# Patient Record
Sex: FEMALE | Race: WHITE | Employment: FULL TIME | URBAN - METROPOLITAN AREA
[De-identification: names, ages, dates, MRNs, and addresses within clinical notes are randomized per-mention and may not be internally consistent; named-entity substitution may affect disease eponyms.]

---

## 2017-01-09 ENCOUNTER — TELEPHONE (OUTPATIENT)
Dept: OBGYN CLINIC | Facility: CLINIC | Age: 22
End: 2017-01-09

## 2017-01-09 NOTE — TELEPHONE ENCOUNTER
Pt requesting a refill on birth control until her appt with TRAE on 3/16/17. Pt states that she is not moving, which the message states below.   Last Annual 6/10/15 with TRAE.    (Pt had a now show 11/10/16 and a cancel appt 9/8/16.)  Pt had a refill on graciela

## 2017-01-17 ENCOUNTER — TELEPHONE (OUTPATIENT)
Dept: OBGYN CLINIC | Facility: CLINIC | Age: 22
End: 2017-01-17

## 2017-02-19 ENCOUNTER — HOSPITAL ENCOUNTER (OUTPATIENT)
Age: 22
Discharge: HOME | End: 2017-02-19
Attending: EMERGENCY MEDICINE
Payer: COMMERCIAL

## 2017-02-19 VITALS
SYSTOLIC BLOOD PRESSURE: 128 MMHG | WEIGHT: 128 LBS | DIASTOLIC BLOOD PRESSURE: 65 MMHG | BODY MASS INDEX: 25.13 KG/M2 | TEMPERATURE: 98 F | RESPIRATION RATE: 16 BRPM | OXYGEN SATURATION: 97 % | HEART RATE: 96 BPM | HEIGHT: 60 IN

## 2017-02-19 DIAGNOSIS — J40 BRONCHITIS: Primary | ICD-10-CM

## 2017-02-19 LAB — S PYO AG THROAT QL: NEGATIVE

## 2017-02-19 PROCEDURE — 99214 OFFICE O/P EST MOD 30 MIN: CPT

## 2017-02-19 PROCEDURE — 99213 OFFICE O/P EST LOW 20 MIN: CPT

## 2017-02-19 PROCEDURE — 87430 STREP A AG IA: CPT

## 2017-02-19 RX ORDER — AZITHROMYCIN 250 MG/1
TABLET, FILM COATED ORAL
Qty: 1 PACKAGE | Refills: 0 | Status: SHIPPED | OUTPATIENT
Start: 2017-02-19 | End: 2017-03-02

## 2017-02-19 NOTE — ED NOTES
Patient presents with cough, sore throat, and runny nose for 1 week. Patient state she had a fever last Tuesday, not since then. LS clear, throat appears red. Rapid strep pending.

## 2017-02-19 NOTE — ED PROVIDER NOTES
Riley Marcum is a 19year old female who presents for evaluation of sore throat and cough for the past week  HPI:   Pt complains of sore throat and cough. Patient has been using over-the-counter medication with minimal relief of symptoms.   She denies Labs Reviewed - No data to display    No orders to display  Patient had negative rapid strep screen  MDM           Disposition and Plan     Clinical Impression:  Bronchitis  (primary encounter diagnosis)    Disposition:  Discharge    Follow-up:  [facility]

## 2017-03-02 ENCOUNTER — OFFICE VISIT (OUTPATIENT)
Dept: OBGYN CLINIC | Facility: CLINIC | Age: 22
End: 2017-03-02

## 2017-03-02 ENCOUNTER — APPOINTMENT (OUTPATIENT)
Dept: LAB | Age: 22
End: 2017-03-02
Attending: OBSTETRICS & GYNECOLOGY
Payer: COMMERCIAL

## 2017-03-02 VITALS
HEART RATE: 105 BPM | DIASTOLIC BLOOD PRESSURE: 84 MMHG | WEIGHT: 132 LBS | SYSTOLIC BLOOD PRESSURE: 125 MMHG | BODY MASS INDEX: 24.6 KG/M2 | HEIGHT: 61.5 IN

## 2017-03-02 DIAGNOSIS — Z11.3 VENEREAL DISEASE SCREENING: ICD-10-CM

## 2017-03-02 DIAGNOSIS — Z01.419 ENCOUNTER FOR GYNECOLOGICAL EXAMINATION WITHOUT ABNORMAL FINDING: Primary | ICD-10-CM

## 2017-03-02 PROCEDURE — 36415 COLL VENOUS BLD VENIPUNCTURE: CPT

## 2017-03-02 PROCEDURE — 86780 TREPONEMA PALLIDUM: CPT

## 2017-03-02 PROCEDURE — 87340 HEPATITIS B SURFACE AG IA: CPT

## 2017-03-02 PROCEDURE — 87389 HIV-1 AG W/HIV-1&-2 AB AG IA: CPT

## 2017-03-02 PROCEDURE — 99395 PREV VISIT EST AGE 18-39: CPT | Performed by: OBSTETRICS & GYNECOLOGY

## 2017-03-02 PROCEDURE — 86803 HEPATITIS C AB TEST: CPT

## 2017-03-02 RX ORDER — LEVONORGESTREL AND ETHINYL ESTRADIOL 0.15-0.03
1 KIT ORAL
Qty: 3 PACKAGE | Refills: 3 | Status: SHIPPED | OUTPATIENT
Start: 2017-03-02 | End: 2018-10-03

## 2017-03-02 NOTE — PROGRESS NOTES
HPI:    Patient ID: Anastasiia See is a 19year old female. HPI  with normally regular menses on OCP but Rx ran out in December. New relationship since December- met through friends. Condoms for BC. She works at a [employer]. [employer] doing well. enlarged and not tender. Cervix exhibits no motion tenderness and no discharge. Right adnexum displays no mass, no tenderness and no fullness. Left adnexum displays no mass, no tenderness and no fullness. No vaginal discharge found.    Musculoskeletal: She

## 2017-03-03 LAB
C TRACH DNA SPEC QL NAA+PROBE: NEGATIVE
HBV SURFACE AG SERPL QL IA: NONREACTIVE
HCV AB SERPL QL IA: NONREACTIVE
HIV1+2 AB SERPL QL IA: NONREACTIVE
N GONORRHOEA DNA SPEC QL NAA+PROBE: NEGATIVE
T PALLIDUM AB SER QL: NEGATIVE
T VAGINALIS RRNA SPEC QL NAA+PROBE: NEGATIVE

## 2018-03-27 ENCOUNTER — TELEPHONE (OUTPATIENT)
Dept: OBGYN CLINIC | Facility: CLINIC | Age: 23
End: 2018-03-27

## 2018-10-03 ENCOUNTER — OFFICE VISIT (OUTPATIENT)
Dept: OBGYN CLINIC | Facility: CLINIC | Age: 23
End: 2018-10-03

## 2018-10-03 VITALS
WEIGHT: 148.81 LBS | SYSTOLIC BLOOD PRESSURE: 120 MMHG | BODY MASS INDEX: 28.1 KG/M2 | DIASTOLIC BLOOD PRESSURE: 83 MMHG | HEART RATE: 76 BPM | HEIGHT: 61 IN

## 2018-10-03 DIAGNOSIS — Z11.3 SCREENING EXAMINATION FOR VENEREAL DISEASE: ICD-10-CM

## 2018-10-03 DIAGNOSIS — Z01.419 ENCOUNTER FOR GYNECOLOGICAL EXAMINATION WITHOUT ABNORMAL FINDING: Primary | ICD-10-CM

## 2018-10-03 DIAGNOSIS — Z11.3 SCREEN FOR STD (SEXUALLY TRANSMITTED DISEASE): ICD-10-CM

## 2018-10-03 DIAGNOSIS — Z12.4 SCREENING FOR MALIGNANT NEOPLASM OF CERVIX: ICD-10-CM

## 2018-10-03 DIAGNOSIS — B36.0 TINEA VERSICOLOR: ICD-10-CM

## 2018-10-03 PROCEDURE — 99395 PREV VISIT EST AGE 18-39: CPT | Performed by: OBSTETRICS & GYNECOLOGY

## 2018-10-03 RX ORDER — MUPIROCIN CALCIUM 20 MG/G
CREAM TOPICAL
COMMUNITY
Start: 2012-06-02 | End: 2020-01-18

## 2018-10-03 RX ORDER — LEVONORGESTREL AND ETHINYL ESTRADIOL 0.15-0.03
1 KIT ORAL
Qty: 3 PACKAGE | Refills: 3 | Status: SHIPPED | OUTPATIENT
Start: 2018-10-03 | End: 2020-01-18

## 2018-10-03 NOTE — PROGRESS NOTES
HPI:    Patient ID: Earline Carrier is a 23year old female. HPI  with regular menses and condoms for [facility]. Back with Kushal in the past year. He is father of her child. New family history with Dad's MI in 2017 at age 62.  Only issue on ROS is a sma mass. There is no tenderness. There is no rebound and no guarding. Genitourinary: Vagina normal and uterus normal. No breast discharge. There is no rash or lesion on the right labia. There is no rash or lesion on the left labia.  Uterus is not enlarged an

## 2018-10-25 ENCOUNTER — TELEPHONE (OUTPATIENT)
Dept: OBGYN CLINIC | Facility: CLINIC | Age: 23
End: 2018-10-25

## 2018-10-25 NOTE — TELEPHONE ENCOUNTER
Informed pt that TRAE stated that her STD culture is negative for gonorrhea, chlamydia and trichomonas. Informed pt that TRAE stated her pap shows a very mild abnormality called atypia.   When this happens in age < 27, the lab then does an HPV test which g

## 2018-10-25 NOTE — TELEPHONE ENCOUNTER
----- Message from Lorraine Ventura DO sent at 10/19/2018  6:37 PM CDT -----  Lovely Klein. Your STD culture is negative for gonorrhea, chlamydia and trichomonas. The pap shows a very mild abnormality called \"atypia\".  When this happens in age < 27, the l

## 2019-07-18 ENCOUNTER — HOSPITAL ENCOUNTER (OUTPATIENT)
Age: 24
Discharge: HOME | End: 2019-07-18
Attending: FAMILY MEDICINE
Payer: COMMERCIAL

## 2019-07-18 VITALS
RESPIRATION RATE: 20 BRPM | BODY MASS INDEX: 28.32 KG/M2 | TEMPERATURE: 99 F | OXYGEN SATURATION: 98 % | HEART RATE: 96 BPM | WEIGHT: 150 LBS | DIASTOLIC BLOOD PRESSURE: 74 MMHG | HEIGHT: 61 IN | SYSTOLIC BLOOD PRESSURE: 121 MMHG

## 2019-07-18 DIAGNOSIS — B00.1 COLD SORE: Primary | ICD-10-CM

## 2019-07-18 PROCEDURE — 99213 OFFICE O/P EST LOW 20 MIN: CPT

## 2019-07-18 PROCEDURE — 99214 OFFICE O/P EST MOD 30 MIN: CPT

## 2019-07-18 RX ORDER — VALACYCLOVIR HYDROCHLORIDE 1 G/1
2 TABLET, FILM COATED ORAL 2 TIMES DAILY
Qty: 4 TABLET | Refills: 0 | Status: SHIPPED | OUTPATIENT
Start: 2019-07-18 | End: 2019-07-19

## 2019-07-18 NOTE — ED PROVIDER NOTES
[facility]    History   Patient presents with:  Rash Skin Problem (integumentary)    Stated Complaint: rash    HPI    Patient is here with a blistery rash noted over the chin and the left side of the lowe crop of blistery rash noted to the left chin. Similar ulcer noted over the left lower lip. No significant erythema. No purulent discharge. Psychiatric: She has a normal mood and affect. Her behavior is normal.   Nursing note and vitals reviewed.

## 2019-08-17 ENCOUNTER — HOSPITAL ENCOUNTER (OUTPATIENT)
Age: 24
Discharge: HOME | End: 2019-08-17
Payer: COMMERCIAL

## 2019-08-17 VITALS
RESPIRATION RATE: 18 BRPM | HEIGHT: 61 IN | SYSTOLIC BLOOD PRESSURE: 111 MMHG | HEART RATE: 90 BPM | WEIGHT: 138 LBS | TEMPERATURE: 98 F | DIASTOLIC BLOOD PRESSURE: 70 MMHG | BODY MASS INDEX: 26.06 KG/M2 | OXYGEN SATURATION: 98 %

## 2019-08-17 DIAGNOSIS — R59.0 LYMPHADENOPATHY OF LEFT CERVICAL REGION: Primary | ICD-10-CM

## 2019-08-17 LAB — S PYO AG THROAT QL: NEGATIVE

## 2019-08-17 PROCEDURE — 99213 OFFICE O/P EST LOW 20 MIN: CPT

## 2019-08-17 PROCEDURE — 99214 OFFICE O/P EST MOD 30 MIN: CPT

## 2019-08-17 PROCEDURE — 87430 STREP A AG IA: CPT

## 2019-08-17 RX ORDER — METHYLPREDNISOLONE 4 MG/1
TABLET ORAL
Qty: 1 PACKAGE | Refills: 0 | Status: SHIPPED | OUTPATIENT
Start: 2019-08-17 | End: 2020-01-18

## 2019-08-17 RX ORDER — AMOXICILLIN AND CLAVULANATE POTASSIUM 875; 125 MG/1; MG/1
1 TABLET, FILM COATED ORAL 2 TIMES DAILY
Qty: 20 TABLET | Refills: 0 | Status: SHIPPED | OUTPATIENT
Start: 2019-08-17 | End: 2019-08-27

## 2019-08-17 NOTE — ED PROVIDER NOTES
Patient presents with:  Rash Skin Problem (integumentary)      HPI:     Tom Ernst is a 20year old female with no significant past medical history presents with a chief complaint of swelling to the lymph nodes on the R side of neck.  Pt reports it st Tablet Therapy Pack          Sig: Dosepack: take as directed          Dispense:  1 Package          Refill:  0      Amoxicillin-Pot Clavulanate 875-125 MG Oral Tab          Sig: Take 1 tablet by mouth 2 (two) times daily for 10 days.           Dispense:  20

## 2019-08-17 NOTE — ED INITIAL ASSESSMENT (HPI)
PATIENT ARRIVED AMBULATORY TO ROOM C/O A RASH TO THE FACE THAT STARTED THIS MORNING. NO NEW PRODUCTS. NO FEVERS. +ITCHINESS. EASY NON LABORED RESPIRATIONS.

## 2019-08-22 ENCOUNTER — OFFICE VISIT (OUTPATIENT)
Dept: INTERNAL MEDICINE CLINIC | Facility: CLINIC | Age: 24
End: 2019-08-22

## 2019-08-22 VITALS
WEIGHT: 150 LBS | BODY MASS INDEX: 28 KG/M2 | HEART RATE: 68 BPM | SYSTOLIC BLOOD PRESSURE: 116 MMHG | DIASTOLIC BLOOD PRESSURE: 81 MMHG | RESPIRATION RATE: 16 BRPM

## 2019-08-22 DIAGNOSIS — R59.0 ENLARGED LYMPH NODE IN NECK: Primary | ICD-10-CM

## 2019-08-22 PROCEDURE — 99203 OFFICE O/P NEW LOW 30 MIN: CPT | Performed by: INTERNAL MEDICINE

## 2019-08-22 NOTE — PROGRESS NOTES
Earline Carrier is a 20year old female. HPI:     1. Swollen Lymph Nodes in Neck    Had developed 3 lymph nodes on left side of jaw and by ear a week ago. Went to urgent care and was started on augmentin 875 mg BID x 10 days. Feeling better.  No fever, ch BS's,no masses, HSM or tenderness  EXTREMITIES: no cyanosis, clubbing or edema    ASSESSMENT AND PLAN:   1. Enlarged lymph node in neck    Finish taking the 10 day course of augmentin 875 mg BID. Call if fever, chills or lymph nodes enlarge again.     The p

## 2020-01-18 ENCOUNTER — LAB ENCOUNTER (OUTPATIENT)
Dept: LAB | Facility: HOSPITAL | Age: 25
End: 2020-01-18
Attending: OBSTETRICS & GYNECOLOGY
Payer: COMMERCIAL

## 2020-01-18 ENCOUNTER — OFFICE VISIT (OUTPATIENT)
Dept: OBGYN CLINIC | Facility: CLINIC | Age: 25
End: 2020-01-18

## 2020-01-18 VITALS
HEIGHT: 61 IN | BODY MASS INDEX: 28.13 KG/M2 | DIASTOLIC BLOOD PRESSURE: 83 MMHG | WEIGHT: 149 LBS | HEART RATE: 81 BPM | SYSTOLIC BLOOD PRESSURE: 116 MMHG

## 2020-01-18 DIAGNOSIS — Z01.419 ENCOUNTER FOR GYNECOLOGICAL EXAMINATION WITHOUT ABNORMAL FINDING: Primary | ICD-10-CM

## 2020-01-18 DIAGNOSIS — Z11.3 SCREENING EXAMINATION FOR VENEREAL DISEASE: ICD-10-CM

## 2020-01-18 DIAGNOSIS — Z12.4 SCREENING FOR MALIGNANT NEOPLASM OF CERVIX: ICD-10-CM

## 2020-01-18 LAB
HBV SURFACE AG SER-ACNC: <0.1 [IU]/L
HBV SURFACE AG SERPL QL IA: NONREACTIVE
HCV AB SERPL QL IA: NONREACTIVE

## 2020-01-18 PROCEDURE — 87340 HEPATITIS B SURFACE AG IA: CPT

## 2020-01-18 PROCEDURE — 99395 PREV VISIT EST AGE 18-39: CPT | Performed by: OBSTETRICS & GYNECOLOGY

## 2020-01-18 PROCEDURE — 86780 TREPONEMA PALLIDUM: CPT

## 2020-01-18 PROCEDURE — 86803 HEPATITIS C AB TEST: CPT

## 2020-01-18 PROCEDURE — 87389 HIV-1 AG W/HIV-1&-2 AB AG IA: CPT

## 2020-01-18 PROCEDURE — 36415 COLL VENOUS BLD VENIPUNCTURE: CPT

## 2020-01-18 RX ORDER — LEVONORGESTREL AND ETHINYL ESTRADIOL 0.15-0.03
1 KIT ORAL
Qty: 3 PACKAGE | Refills: 3 | Status: SHIPPED | OUTPATIENT
Start: 2020-01-18 | End: 2021-07-07

## 2020-01-20 LAB
C TRACH DNA SPEC QL NAA+PROBE: NEGATIVE
N GONORRHOEA DNA SPEC QL NAA+PROBE: NEGATIVE
T PALLIDUM AB SER QL: NEGATIVE
T VAGINALIS RRNA SPEC QL NAA+PROBE: NEGATIVE

## 2020-01-20 NOTE — PROGRESS NOTES
HPI:    Patient ID: Rosa Maria Noyola is a 30year old female. HPI  with regular menses on OCP for BC. She is in new relationship since October. She is due for repeat pap and will need STD screening.      Review of Systems   Constitutional: Lacy Ashley displays no mass, no tenderness and no fullness. Left adnexum displays no mass, no tenderness and no fullness. No vaginal discharge. Genitourinary Comments: Bilateral breasts without skin / nipple changes, mass, tenderness or axillary adenopathy.

## 2020-01-28 ENCOUNTER — TELEPHONE (OUTPATIENT)
Dept: OBGYN CLINIC | Facility: CLINIC | Age: 25
End: 2020-01-28

## 2020-01-28 ENCOUNTER — TELEPHONE (OUTPATIENT)
Dept: PEDIATRICS CLINIC | Facility: CLINIC | Age: 25
End: 2020-01-28

## 2020-01-28 NOTE — TELEPHONE ENCOUNTER
----- Message from Cecy Villeda DO sent at 1/22/2020 11:18 AM CST -----  Sierra Arevalo. The pap smear shows a persistent mild abnormality this year.  This is one step above normal.  With these results and the fact that you are over 24, we'll need to sche

## 2020-01-29 NOTE — TELEPHONE ENCOUNTER
Pt was advised of need for Colpo per TRAE.  Appt scheduled. Pt states she is on birth control and has been for a long time. Pt did not get menses this month but she took multiple home pregnancy tests and they were all negative.   Pt has continued on the p

## 2020-02-11 ENCOUNTER — OFFICE VISIT (OUTPATIENT)
Dept: OBGYN CLINIC | Facility: CLINIC | Age: 25
End: 2020-02-11

## 2020-02-11 VITALS
DIASTOLIC BLOOD PRESSURE: 89 MMHG | HEART RATE: 90 BPM | SYSTOLIC BLOOD PRESSURE: 123 MMHG | BODY MASS INDEX: 29 KG/M2 | WEIGHT: 152.63 LBS

## 2020-02-11 DIAGNOSIS — R87.612 PAPANICOLAOU SMEAR OF CERVIX WITH LOW GRADE SQUAMOUS INTRAEPITHELIAL LESION (LGSIL): Primary | ICD-10-CM

## 2020-02-11 DIAGNOSIS — Z32.00 PREGNANCY EXAMINATION OR TEST, PREGNANCY UNCONFIRMED: ICD-10-CM

## 2020-02-11 LAB — CONTROL LINE PRESENT WITH A CLEAR BACKGROUND (YES/NO): YES YES/NO

## 2020-02-11 PROCEDURE — 81025 URINE PREGNANCY TEST: CPT | Performed by: OBSTETRICS & GYNECOLOGY

## 2020-02-11 PROCEDURE — 57454 BX/CURETT OF CERVIX W/SCOPE: CPT | Performed by: OBSTETRICS & GYNECOLOGY

## 2020-02-13 PROBLEM — R87.612 PAPANICOLAOU SMEAR OF CERVIX WITH LOW GRADE SQUAMOUS INTRAEPITHELIAL LESION (LGSIL): Status: ACTIVE | Noted: 2020-02-13

## 2020-02-13 NOTE — PROGRESS NOTES
Colpo w/Cx Biopsy and ECC    Pregnancy Results: negative from urine test   Birth control method(s) used:   OCP  Consent signed. Procedure discussed with patient in detail including indication, risk, benefits, alternatives and complications.     Pre-Procedu

## 2020-02-24 ENCOUNTER — TELEPHONE (OUTPATIENT)
Dept: OBGYN CLINIC | Facility: CLINIC | Age: 25
End: 2020-02-24

## 2020-02-24 NOTE — TELEPHONE ENCOUNTER
Received refill request from [facility] for pts OCP. Form faxed back denied stating that pt had OCP refill on 1/18/2020 for 3 packs with 3 refills. Pt should still have refills available.

## 2020-11-10 ENCOUNTER — OFFICE VISIT (OUTPATIENT)
Dept: INTERNAL MEDICINE CLINIC | Facility: CLINIC | Age: 25
End: 2020-11-10
Payer: COMMERCIAL

## 2020-11-10 VITALS
WEIGHT: 164 LBS | DIASTOLIC BLOOD PRESSURE: 85 MMHG | BODY MASS INDEX: 30.96 KG/M2 | RESPIRATION RATE: 16 BRPM | HEIGHT: 61 IN | SYSTOLIC BLOOD PRESSURE: 128 MMHG | HEART RATE: 101 BPM

## 2020-11-10 DIAGNOSIS — Z00.00 PHYSICAL EXAM, ANNUAL: Primary | ICD-10-CM

## 2020-11-10 DIAGNOSIS — R06.83 SNORING: ICD-10-CM

## 2020-11-10 PROCEDURE — 3074F SYST BP LT 130 MM HG: CPT | Performed by: INTERNAL MEDICINE

## 2020-11-10 PROCEDURE — 99395 PREV VISIT EST AGE 18-39: CPT | Performed by: INTERNAL MEDICINE

## 2020-11-10 PROCEDURE — 99072 ADDL SUPL MATRL&STAF TM PHE: CPT | Performed by: INTERNAL MEDICINE

## 2020-11-10 PROCEDURE — 3079F DIAST BP 80-89 MM HG: CPT | Performed by: INTERNAL MEDICINE

## 2020-11-10 PROCEDURE — 3008F BODY MASS INDEX DOCD: CPT | Performed by: INTERNAL MEDICINE

## 2020-11-10 NOTE — PROGRESS NOTES
HPI:   Taryn Hernandez is a 30year old female who presents for a complete physical exam. Symptoms: periods are regular. Patient complains of nothing.        Immunization History  Administered            Date(s) Administered    DPT/HIB               02/28/ Tobacco Use      Smoking status: Never Smoker      Smokeless tobacco: Current User    Alcohol use:  Yes      Alcohol/week: 0.0 standard drinks      Comment: SOCIALLY    Drug use: No      Comment: NONE    Occ:  : no. Children: no.   Exercise:  twice p Health maintenance, will check fasting Lipids, CMP, and CBC. Pt referred for screening colonoscopy. Pt info handouts given for: exercise, low fat diet and breast self-exam. Pt' s weight is Body mass index is 30.99 kg/m². , recommended low fat diet and aerob

## 2020-11-11 ENCOUNTER — LAB ENCOUNTER (OUTPATIENT)
Dept: LAB | Age: 25
End: 2020-11-11
Attending: INTERNAL MEDICINE
Payer: COMMERCIAL

## 2020-11-11 DIAGNOSIS — Z00.00 PHYSICAL EXAM, ANNUAL: ICD-10-CM

## 2020-11-11 PROCEDURE — 84443 ASSAY THYROID STIM HORMONE: CPT

## 2020-11-11 PROCEDURE — 80061 LIPID PANEL: CPT

## 2020-11-11 PROCEDURE — 36415 COLL VENOUS BLD VENIPUNCTURE: CPT

## 2020-11-11 PROCEDURE — 81001 URINALYSIS AUTO W/SCOPE: CPT

## 2020-11-11 PROCEDURE — 80053 COMPREHEN METABOLIC PANEL: CPT

## 2020-11-11 PROCEDURE — 85025 COMPLETE CBC W/AUTO DIFF WBC: CPT

## 2021-03-23 ENCOUNTER — TELEPHONE (OUTPATIENT)
Dept: OBGYN CLINIC | Facility: CLINIC | Age: 26
End: 2021-03-23

## 2021-03-23 NOTE — TELEPHONE ENCOUNTER
Received fax from pharmacy for [facility] refill request. Fax sent back denied.      No upcoming appt made  Last annual=  1/18/2020

## 2021-04-29 ENCOUNTER — TELEPHONE (OUTPATIENT)
Dept: OBGYN CLINIC | Facility: CLINIC | Age: 26
End: 2021-04-29

## 2021-04-29 NOTE — TELEPHONE ENCOUNTER
OCP refill request received via fax. Pt's last annual was on 1/18/20, pap LSIL on 1/18 colpo on 2/11 mild dysplasia. Has next annual on 7/7/21.  Refill request to [facility]

## 2021-07-07 ENCOUNTER — OFFICE VISIT (OUTPATIENT)
Dept: OBGYN CLINIC | Facility: CLINIC | Age: 26
End: 2021-07-07
Payer: COMMERCIAL

## 2021-07-07 VITALS
WEIGHT: 167.19 LBS | HEART RATE: 106 BPM | SYSTOLIC BLOOD PRESSURE: 128 MMHG | BODY MASS INDEX: 32 KG/M2 | DIASTOLIC BLOOD PRESSURE: 82 MMHG

## 2021-07-07 DIAGNOSIS — Z12.4 SCREENING FOR MALIGNANT NEOPLASM OF CERVIX: ICD-10-CM

## 2021-07-07 DIAGNOSIS — Z11.3 SCREENING EXAMINATION FOR VENEREAL DISEASE: ICD-10-CM

## 2021-07-07 DIAGNOSIS — Z01.419 ENCOUNTER FOR GYNECOLOGICAL EXAMINATION WITHOUT ABNORMAL FINDING: Primary | ICD-10-CM

## 2021-07-07 PROCEDURE — 3074F SYST BP LT 130 MM HG: CPT | Performed by: OBSTETRICS & GYNECOLOGY

## 2021-07-07 PROCEDURE — 3079F DIAST BP 80-89 MM HG: CPT | Performed by: OBSTETRICS & GYNECOLOGY

## 2021-07-07 PROCEDURE — 99395 PREV VISIT EST AGE 18-39: CPT | Performed by: OBSTETRICS & GYNECOLOGY

## 2021-07-07 RX ORDER — NORETHINDRONE ACETATE AND ETHINYL ESTRADIOL 1MG-20(21)
1 KIT ORAL DAILY
Qty: 84 TABLET | Refills: 3 | Status: SHIPPED | OUTPATIENT
Start: 2021-07-07 | End: 2022-01-27

## 2021-07-08 LAB
C TRACH DNA SPEC QL NAA+PROBE: NEGATIVE
N GONORRHOEA DNA SPEC QL NAA+PROBE: NEGATIVE

## 2021-07-09 LAB — T VAGINALIS RRNA SPEC QL NAA+PROBE: NEGATIVE

## 2021-07-11 NOTE — PROGRESS NOTES
HPI/Subjective:   Patient ID: Anastasiia See is a 30year old female. HPI   with menses q 28d / 5d / cramps on day 1. New relationship using condom and wishes to return to [facility]. No new family or personal medical issues.  Greta Roosevelt is now 5 y/o and d No rash or lesion. Vagina: Normal. No vaginal discharge. Cervix: No cervical motion tenderness or discharge. Uterus: Not enlarged and not tender. Adnexa:         Right: No mass, tenderness or fullness.           Left: No mass, tenderne

## 2021-07-13 NOTE — PROGRESS NOTES
Meli Banks. You have negative pap result. Repeat exam in 1 year and pap in 3 years. We just ended the call. I would not treat a vaginal infection suggested by a pap reading unless the patient had symptoms. Remember that your STD screen is also negative.  Roxana Carvajal

## 2022-01-26 ENCOUNTER — PATIENT MESSAGE (OUTPATIENT)
Dept: OBGYN CLINIC | Facility: CLINIC | Age: 27
End: 2022-01-26

## 2022-01-26 NOTE — TELEPHONE ENCOUNTER
From: Ronald Eckert  To: Rema Butler.  DO Mihir  Sent: 1/26/2022 1:53 PM CST  Subject: Birth control    Hello,     I was wondering if I would be able to switch brand of birth control, my periods have been really bad on this one were it’s very heavy and I

## 2022-01-27 RX ORDER — LEVONORGESTREL AND ETHINYL ESTRADIOL 0.15-0.03
1 KIT ORAL DAILY
Qty: 84 TABLET | Refills: 1 | Status: SHIPPED | OUTPATIENT
Start: 2022-01-27 | End: 2023-01-27

## 2022-01-27 NOTE — TELEPHONE ENCOUNTER
I called and spoke with Dave Thorpe concerning OCP. She had used Julissa Ruse with success in remote past. I sent 6 month Rx now.  I'll see her in July or August for annual.

## 2022-08-02 ENCOUNTER — HOSPITAL ENCOUNTER (OUTPATIENT)
Age: 27
Discharge: HOME | End: 2022-08-02
Payer: COMMERCIAL

## 2022-08-02 VITALS
OXYGEN SATURATION: 100 % | WEIGHT: 145 LBS | BODY MASS INDEX: 27.38 KG/M2 | TEMPERATURE: 98 F | HEIGHT: 61 IN | HEART RATE: 89 BPM | RESPIRATION RATE: 18 BRPM | DIASTOLIC BLOOD PRESSURE: 88 MMHG | SYSTOLIC BLOOD PRESSURE: 119 MMHG

## 2022-08-02 DIAGNOSIS — H66.92 LEFT OTITIS MEDIA, UNSPECIFIED OTITIS MEDIA TYPE: ICD-10-CM

## 2022-08-02 DIAGNOSIS — H72.91 RUPTURED TYMPANIC MEMBRANE, RIGHT: Primary | ICD-10-CM

## 2022-08-02 PROCEDURE — 99213 OFFICE O/P EST LOW 20 MIN: CPT

## 2022-08-02 RX ORDER — AMOXICILLIN 875 MG/1
875 TABLET, COATED ORAL 2 TIMES DAILY
Qty: 20 TABLET | Refills: 0 | Status: SHIPPED | OUTPATIENT
Start: 2022-08-02 | End: 2022-08-12

## 2022-08-02 RX ORDER — OFLOXACIN 3 MG/ML
5 SOLUTION AURICULAR (OTIC) DAILY
Qty: 10 ML | Refills: 0 | Status: SHIPPED | OUTPATIENT
Start: 2022-08-02 | End: 2022-08-09

## 2022-08-02 NOTE — ED INITIAL ASSESSMENT (HPI)
Pt presents with bilateral ear pain x 2-3 days. Pt reports \"muffled\" hearing. Tylenol PO taken at 630a today.

## 2022-08-15 ENCOUNTER — OFFICE VISIT (OUTPATIENT)
Facility: LOCATION | Age: 27
End: 2022-08-15
Payer: COMMERCIAL

## 2022-08-15 DIAGNOSIS — H69.93 UNSPECIFIED EUSTACHIAN TUBE DISORDER, BILATERAL: Primary | ICD-10-CM

## 2022-08-15 DIAGNOSIS — H72.91 RUPTURED EARDRUM, RIGHT: Primary | ICD-10-CM

## 2022-08-15 DIAGNOSIS — H91.93 AUDITORY IMPAIRMENT, BILATERAL: ICD-10-CM

## 2022-12-02 ENCOUNTER — TELEPHONE (OUTPATIENT)
Dept: OBGYN CLINIC | Facility: CLINIC | Age: 27
End: 2022-12-02

## 2022-12-02 NOTE — TELEPHONE ENCOUNTER
LMP 10/26, periods every 28-30 days (5w2d). Pt agrees to see all providers. OBN appt made on 12/20. Pt to start pnv with dha, folic acid, and iron. Pr to call with questions, spotting, or cramping. Per pt, HT 5'1\", #, BMI 28.3.

## 2022-12-20 ENCOUNTER — NURSE ONLY (OUTPATIENT)
Dept: OBGYN CLINIC | Facility: CLINIC | Age: 27
End: 2022-12-20
Payer: COMMERCIAL

## 2022-12-20 DIAGNOSIS — Z34.91 FIRST TRIMESTER PREGNANCY: Primary | ICD-10-CM

## 2022-12-20 RX ORDER — CHOLECALCIFEROL (VITAMIN D3) 25 MCG
1 TABLET,CHEWABLE ORAL DAILY
COMMUNITY

## 2022-12-20 NOTE — PROGRESS NOTES
Pt seen for OBN-PC appt today with no complaints. Normal PN labs and qual lab ordered. Pt advised all labs must be completed and resulted no later then a few days prior to MD appt to prevent appt to be rescheduled. Pt requesting NPN appt on a Tuesday and accepted NPN appt on 12/27. Pre-pregnancy weight: 150 lb   Current weight: Does not know. Height: 5' 1\"  Partner's name is Everett Buchanan Brought) Korey Castellon contact #[phone]; race: White    Pt's occupation: [employer]    MEDICAL HISTORY    Anemia No    Anesthetic complications No    Anxiety/Depression  No    Autoimmune Disorder No    Asthma  No    Cancer No    Diabetes  No    Gyne/breast Surgery No    Heart Disease No    Hepatitis/Liver Disease  No    History of blood transfusion No    History of abnormal pap Yes 2019. States Colpo was done and paps since have been normal    Hypertension  No    Infertility  No    Kidney Disease/Frequent UTIs  No    Medication Allergies No    Latex Allergies No    Food Allergies  No    Neurological Disorder/Epilepsy No    Operations/Hospitalizations No    TB exposure  No    Thyroid Dysfunction No    Trauma/Violence  No    Uterine Anomaly  No    Uterine Fibroids  No    Variocosities/DVTs No    Smoker No    Drug usage in prior year No    Alcohol Yes Socially. Stopped before pregnancy    Would you accept a blood transfusion? If no, are you a Episcopal? Yes                INFECTION HISTORY    Chlamydia No    Pt or partner have hx of Genital Herpes No    Gonorrhea No    Hepatitis B No    HIV No    HPV No    MRSA No    Syphilis No    Tattoos No    Live with someone or Exposed to TB No    Rash or viral illness since LMP  No    Varicella Yes In childhood    Recent Travel (or planned travel) to [facility] area for self and or partner No    Pets Yes Dog & cat.  Boyfriend changes litter box       GENETICS SCREENING    Genetic Screening    Genetic Screening/Teratology Counseling- Includes patient, baby's father, or anyone in either family with:  Patient's age 28 years or older as of estimated date of delivery: No   Thalassemia ([facility] [place], [address], or  background): MCV less than 80: No   Neural tube defect (Meningomyelocele, Spina bifida, or Anencephaly): No   Congenital heart defect: No   Down syndrome: No   Ori-Sachs (Ashkenazi Yars[place], [place], [place]): No   Canavan disease (Ashkenazi Yars[place]): No   Familial dysautonomia (Ashkenazi Yarsani): No   Sickle cell disease or trait (): No   Hemophilia or other blood disorders: No   Muscular dystrophy: No    Cystic fibrosis: No   Triston's chorea: No   Intellectual disability and/or autism: No   Other inherited genetic or chromosomal disorder: No   Maternal metabolic disorder (eg. Type 1 diabetes, PKU): No   Patient or baby's father had child with birth defects not listed above: No   Recurrent pregnancy loss, or a stillbirth: No   Medications (including supplements, vitamins, herbs, or OTC drugs)/illicit/recreational drugs/alcohol since last menstrual period: No               MISC    Infant vaccinations  Yes    Pt. Has answered NO 5P questions and has NO  risk factors.     Pt. Given What pregnant women need to know handout.          '

## 2022-12-22 ENCOUNTER — LAB ENCOUNTER (OUTPATIENT)
Dept: LAB | Age: 27
End: 2022-12-22
Attending: OBSTETRICS & GYNECOLOGY
Payer: COMMERCIAL

## 2022-12-22 DIAGNOSIS — Z34.91 FIRST TRIMESTER PREGNANCY: ICD-10-CM

## 2022-12-22 LAB
ANTIBODY SCREEN: NEGATIVE
BASOPHILS # BLD AUTO: 0.04 X10(3) UL (ref 0–0.2)
BASOPHILS NFR BLD AUTO: 0.4 %
DEPRECATED RDW RBC AUTO: 44 FL (ref 35.1–46.3)
EOSINOPHIL # BLD AUTO: 0.25 X10(3) UL (ref 0–0.7)
EOSINOPHIL NFR BLD AUTO: 2.6 %
ERYTHROCYTE [DISTWIDTH] IN BLOOD BY AUTOMATED COUNT: 13.4 % (ref 11–15)
HBV SURFACE AG SER-ACNC: 0.99 [IU]/L
HBV SURFACE AG SERPL QL IA: NONREACTIVE
HCG SERPL QL: POSITIVE
HCT VFR BLD AUTO: 41.3 %
HCV AB SERPL QL IA: NONREACTIVE
HGB BLD-MCNC: 13.4 G/DL
IMM GRANULOCYTES # BLD AUTO: 0.03 X10(3) UL (ref 0–1)
IMM GRANULOCYTES NFR BLD: 0.3 %
LYMPHOCYTES # BLD AUTO: 1.78 X10(3) UL (ref 1–4)
LYMPHOCYTES NFR BLD AUTO: 18.6 %
MCH RBC QN AUTO: 29.3 PG (ref 26–34)
MCHC RBC AUTO-ENTMCNC: 32.4 G/DL (ref 31–37)
MCV RBC AUTO: 90.2 FL
MONOCYTES # BLD AUTO: 0.54 X10(3) UL (ref 0.1–1)
MONOCYTES NFR BLD AUTO: 5.6 %
NEUTROPHILS # BLD AUTO: 6.93 X10 (3) UL (ref 1.5–7.7)
NEUTROPHILS # BLD AUTO: 6.93 X10(3) UL (ref 1.5–7.7)
NEUTROPHILS NFR BLD AUTO: 72.5 %
PLATELET # BLD AUTO: 372 10(3)UL (ref 150–450)
RBC # BLD AUTO: 4.58 X10(6)UL
RH BLOOD TYPE: POSITIVE
RUBV IGG SER QL: POSITIVE
RUBV IGG SER-ACNC: 124.6 IU/ML (ref 10–?)
WBC # BLD AUTO: 9.6 X10(3) UL (ref 4–11)

## 2022-12-22 PROCEDURE — 36415 COLL VENOUS BLD VENIPUNCTURE: CPT

## 2022-12-22 PROCEDURE — 84703 CHORIONIC GONADOTROPIN ASSAY: CPT

## 2022-12-22 PROCEDURE — 86762 RUBELLA ANTIBODY: CPT

## 2022-12-22 PROCEDURE — 87389 HIV-1 AG W/HIV-1&-2 AB AG IA: CPT

## 2022-12-22 PROCEDURE — 86803 HEPATITIS C AB TEST: CPT

## 2022-12-22 PROCEDURE — 86780 TREPONEMA PALLIDUM: CPT

## 2022-12-22 PROCEDURE — 85025 COMPLETE CBC W/AUTO DIFF WBC: CPT

## 2022-12-22 PROCEDURE — 86901 BLOOD TYPING SEROLOGIC RH(D): CPT

## 2022-12-22 PROCEDURE — 87086 URINE CULTURE/COLONY COUNT: CPT

## 2022-12-22 PROCEDURE — 87340 HEPATITIS B SURFACE AG IA: CPT

## 2022-12-22 PROCEDURE — 86850 RBC ANTIBODY SCREEN: CPT

## 2022-12-22 PROCEDURE — 86900 BLOOD TYPING SEROLOGIC ABO: CPT

## 2022-12-23 LAB — T PALLIDUM AB SER QL: NEGATIVE

## 2022-12-27 ENCOUNTER — INITIAL PRENATAL (OUTPATIENT)
Dept: OBGYN CLINIC | Facility: CLINIC | Age: 27
End: 2022-12-27
Payer: COMMERCIAL

## 2022-12-27 ENCOUNTER — TELEPHONE (OUTPATIENT)
Dept: OBGYN CLINIC | Facility: CLINIC | Age: 27
End: 2022-12-27

## 2022-12-27 ENCOUNTER — HOSPITAL ENCOUNTER (OUTPATIENT)
Dept: ULTRASOUND IMAGING | Age: 27
Discharge: HOME | End: 2022-12-27
Attending: OBSTETRICS & GYNECOLOGY
Payer: COMMERCIAL

## 2022-12-27 VITALS
WEIGHT: 177.38 LBS | HEART RATE: 99 BPM | DIASTOLIC BLOOD PRESSURE: 75 MMHG | SYSTOLIC BLOOD PRESSURE: 118 MMHG | BODY MASS INDEX: 34 KG/M2

## 2022-12-27 DIAGNOSIS — Z34.91 ENCOUNTER FOR SUPERVISION OF NORMAL PREGNANCY IN FIRST TRIMESTER, UNSPECIFIED GRAVIDITY: Primary | ICD-10-CM

## 2022-12-27 DIAGNOSIS — Z87.51 HISTORY OF PRETERM DELIVERY: Primary | ICD-10-CM

## 2022-12-27 DIAGNOSIS — Z34.91 ENCOUNTER FOR SUPERVISION OF NORMAL PREGNANCY IN FIRST TRIMESTER, UNSPECIFIED GRAVIDITY: ICD-10-CM

## 2022-12-27 PROBLEM — R87.612 PAPANICOLAOU SMEAR OF CERVIX WITH LOW GRADE SQUAMOUS INTRAEPITHELIAL LESION (LGSIL): Status: RESOLVED | Noted: 2020-02-13 | Resolved: 2022-12-27

## 2022-12-27 PROBLEM — O09.899 HISTORY OF PRETERM DELIVERY, CURRENTLY PREGNANT: Status: ACTIVE | Noted: 2022-12-27

## 2022-12-27 PROBLEM — Z00.00 PHYSICAL EXAM, ANNUAL: Status: RESOLVED | Noted: 2020-11-10 | Resolved: 2022-12-27

## 2022-12-27 PROBLEM — O09.899 HISTORY OF PRETERM DELIVERY, CURRENTLY PREGNANT (HCC): Status: ACTIVE | Noted: 2022-12-27

## 2022-12-27 LAB
APPEARANCE: CLEAR
GLUCOSE (URINE DIPSTICK): NEGATIVE MG/DL
KETONES (URINE DIPSTICK): NEGATIVE MG/DL
LEUKOCYTES: NEGATIVE
MULTISTIX LOT#: ABNORMAL NUMERIC
NITRITE, URINE: NEGATIVE
OCCULT BLOOD: NEGATIVE
PH, URINE: 5 (ref 4.5–8)
SPECIFIC GRAVITY: 1.02 (ref 1–1.03)
URINE-COLOR: YELLOW
UROBILINOGEN,SEMI-QN: 0.2 MG/DL (ref 0–1.9)

## 2022-12-27 PROCEDURE — 76815 OB US LIMITED FETUS(S): CPT | Performed by: OBSTETRICS & GYNECOLOGY

## 2022-12-27 PROCEDURE — 76817 TRANSVAGINAL US OBSTETRIC: CPT | Performed by: OBSTETRICS & GYNECOLOGY

## 2022-12-27 PROCEDURE — 81002 URINALYSIS NONAUTO W/O SCOPE: CPT | Performed by: OBSTETRICS & GYNECOLOGY

## 2022-12-27 PROCEDURE — 76801 OB US < 14 WKS SINGLE FETUS: CPT | Performed by: OBSTETRICS & GYNECOLOGY

## 2022-12-27 NOTE — TELEPHONE ENCOUNTER
Patient will need level 2 US at 20 weeks. She is undecided about genetic screening and will call back if she decides that she wants a referral for genetic screening.

## 2022-12-27 NOTE — PROGRESS NOTES
Initial prenatal. S<D by LMP, dating ultrasound ordered. GC/C collected. Pap UTD. Undecided about genetic screening, will call back if decides that she wants it. Will get Level 2 us at 20 wks for h/o  delivery at 36w.

## 2022-12-28 LAB
C TRACH DNA SPEC QL NAA+PROBE: NEGATIVE
N GONORRHOEA DNA SPEC QL NAA+PROBE: NEGATIVE
T VAGINALIS RRNA SPEC QL NAA+PROBE: NEGATIVE

## 2023-01-04 ENCOUNTER — HOSPITAL ENCOUNTER (EMERGENCY)
Facility: HOSPITAL | Age: 28
Discharge: HOME | End: 2023-01-04
Attending: EMERGENCY MEDICINE
Payer: COMMERCIAL

## 2023-01-04 ENCOUNTER — APPOINTMENT (OUTPATIENT)
Dept: ULTRASOUND IMAGING | Facility: HOSPITAL | Age: 28
End: 2023-01-04
Attending: EMERGENCY MEDICINE
Payer: COMMERCIAL

## 2023-01-04 ENCOUNTER — TELEPHONE (OUTPATIENT)
Dept: OBGYN CLINIC | Facility: CLINIC | Age: 28
End: 2023-01-04

## 2023-01-04 VITALS
OXYGEN SATURATION: 98 % | TEMPERATURE: 97 F | HEART RATE: 67 BPM | SYSTOLIC BLOOD PRESSURE: 117 MMHG | DIASTOLIC BLOOD PRESSURE: 74 MMHG | RESPIRATION RATE: 16 BRPM

## 2023-01-04 DIAGNOSIS — O20.0 THREATENED MISCARRIAGE: Primary | ICD-10-CM

## 2023-01-04 LAB
ALBUMIN SERPL-MCNC: 3.5 G/DL (ref 3.4–5)
ALP LIVER SERPL-CCNC: 47 U/L
ALT SERPL-CCNC: 19 U/L
ANION GAP SERPL CALC-SCNC: 7 MMOL/L (ref 0–18)
AST SERPL-CCNC: 11 U/L (ref 15–37)
B-HCG SERPL-ACNC: ABNORMAL MIU/ML
BASOPHILS # BLD AUTO: 0.03 X10(3) UL (ref 0–0.2)
BASOPHILS NFR BLD AUTO: 0.2 %
BILIRUB DIRECT SERPL-MCNC: 0.1 MG/DL (ref 0–0.2)
BILIRUB SERPL-MCNC: 0.6 MG/DL (ref 0.1–2)
BUN BLD-MCNC: 11 MG/DL (ref 7–18)
BUN/CREAT SERPL: 14.7 (ref 10–20)
CALCIUM BLD-MCNC: 9.1 MG/DL (ref 8.5–10.1)
CHLORIDE SERPL-SCNC: 104 MMOL/L (ref 98–112)
CO2 SERPL-SCNC: 24 MMOL/L (ref 21–32)
CREAT BLD-MCNC: 0.75 MG/DL
DEPRECATED RDW RBC AUTO: 43.4 FL (ref 35.1–46.3)
EOSINOPHIL # BLD AUTO: 0.08 X10(3) UL (ref 0–0.7)
EOSINOPHIL NFR BLD AUTO: 0.6 %
ERYTHROCYTE [DISTWIDTH] IN BLOOD BY AUTOMATED COUNT: 13.3 % (ref 11–15)
GFR SERPLBLD BASED ON 1.73 SQ M-ARVRAT: 110 ML/MIN/1.73M2 (ref 60–?)
GLUCOSE BLD-MCNC: 129 MG/DL (ref 70–99)
HCT VFR BLD AUTO: 37.8 %
HGB BLD-MCNC: 12.5 G/DL
IMM GRANULOCYTES # BLD AUTO: 0.05 X10(3) UL (ref 0–1)
IMM GRANULOCYTES NFR BLD: 0.4 %
LYMPHOCYTES # BLD AUTO: 1.52 X10(3) UL (ref 1–4)
LYMPHOCYTES NFR BLD AUTO: 12.2 %
MCH RBC QN AUTO: 29.5 PG (ref 26–34)
MCHC RBC AUTO-ENTMCNC: 33.1 G/DL (ref 31–37)
MCV RBC AUTO: 89.2 FL
MONOCYTES # BLD AUTO: 0.51 X10(3) UL (ref 0.1–1)
MONOCYTES NFR BLD AUTO: 4.1 %
NEUTROPHILS # BLD AUTO: 10.24 X10 (3) UL (ref 1.5–7.7)
NEUTROPHILS # BLD AUTO: 10.24 X10(3) UL (ref 1.5–7.7)
NEUTROPHILS NFR BLD AUTO: 82.5 %
OSMOLALITY SERPL CALC.SUM OF ELEC: 281 MOSM/KG (ref 275–295)
PLATELET # BLD AUTO: 347 10(3)UL (ref 150–450)
POTASSIUM SERPL-SCNC: 3.7 MMOL/L (ref 3.5–5.1)
PROT SERPL-MCNC: 7.8 G/DL (ref 6.4–8.2)
RBC # BLD AUTO: 4.24 X10(6)UL
SODIUM SERPL-SCNC: 135 MMOL/L (ref 136–145)
WBC # BLD AUTO: 12.4 X10(3) UL (ref 4–11)

## 2023-01-04 PROCEDURE — 76801 OB US < 14 WKS SINGLE FETUS: CPT | Performed by: EMERGENCY MEDICINE

## 2023-01-04 PROCEDURE — 99284 EMERGENCY DEPT VISIT MOD MDM: CPT

## 2023-01-04 PROCEDURE — 85025 COMPLETE CBC W/AUTO DIFF WBC: CPT | Performed by: EMERGENCY MEDICINE

## 2023-01-04 PROCEDURE — 96361 HYDRATE IV INFUSION ADD-ON: CPT

## 2023-01-04 PROCEDURE — 80048 BASIC METABOLIC PNL TOTAL CA: CPT | Performed by: EMERGENCY MEDICINE

## 2023-01-04 PROCEDURE — 96360 HYDRATION IV INFUSION INIT: CPT

## 2023-01-04 PROCEDURE — 80076 HEPATIC FUNCTION PANEL: CPT | Performed by: EMERGENCY MEDICINE

## 2023-01-04 PROCEDURE — 84702 CHORIONIC GONADOTROPIN TEST: CPT | Performed by: EMERGENCY MEDICINE

## 2023-01-04 RX ORDER — ONDANSETRON 2 MG/ML
INJECTION INTRAMUSCULAR; INTRAVENOUS
Status: COMPLETED
Start: 2023-01-04 | End: 2023-01-04

## 2023-01-04 NOTE — ED INITIAL ASSESSMENT (HPI)
Pt presents to ED with c/o lower back pain/cramping, nausea and vaginal bleeding starting this morning.  Pt states she is approx 10 weeks pregnant,

## 2023-01-04 NOTE — DISCHARGE INSTRUCTIONS
Please return to the ER for any worsening symptoms including but not limited to: passing fetal tissue, worsening bleeding, lightheadedness, worsening abdominal pain, weakness, numbness, etc. Please follow with your Obstetrician in the next few days. The Emergency Department is not intended to be a substitute for an effort to provide complete medical care. The imaging, if any, have often been interpreted on a preliminary basis pending final reading by the radiologist. If your blood pressure was greater than 140/90, please have this blood pressure rechecked by your primary care provider carlos the next few days. You will benefit from a further discussion of lifestyle modifications that include Weight Reduction - Dietary Sodium Restriction - Increased Physical Activity and Moderation in alcohol (ETOH) Consumption. If possible check your pressure at home and keep a blood pressure log to bring to your physician.

## 2023-01-05 ENCOUNTER — TELEPHONE (OUTPATIENT)
Dept: PERINATAL CARE | Facility: HOSPITAL | Age: 28
End: 2023-01-05

## 2023-01-05 ENCOUNTER — ROUTINE PRENATAL (OUTPATIENT)
Dept: OBGYN CLINIC | Facility: CLINIC | Age: 28
End: 2023-01-05
Payer: COMMERCIAL

## 2023-01-05 ENCOUNTER — TELEPHONE (OUTPATIENT)
Dept: OBGYN CLINIC | Facility: CLINIC | Age: 28
End: 2023-01-05

## 2023-01-05 VITALS
BODY MASS INDEX: 32 KG/M2 | HEART RATE: 105 BPM | WEIGHT: 169.38 LBS | DIASTOLIC BLOOD PRESSURE: 76 MMHG | SYSTOLIC BLOOD PRESSURE: 112 MMHG

## 2023-01-05 DIAGNOSIS — O09.891 HISTORY OF PRETERM DELIVERY, CURRENTLY PREGNANT IN FIRST TRIMESTER: ICD-10-CM

## 2023-01-05 DIAGNOSIS — Z34.91 ENCOUNTER FOR SUPERVISION OF NORMAL PREGNANCY IN FIRST TRIMESTER, UNSPECIFIED GRAVIDITY: Primary | ICD-10-CM

## 2023-01-05 DIAGNOSIS — Z36.9 FIRST TRIMESTER SCREENING: Primary | ICD-10-CM

## 2023-01-05 LAB
BILIRUBIN: NEGATIVE
GLUCOSE (URINE DIPSTICK): NEGATIVE MG/DL
KETONES (URINE DIPSTICK): NEGATIVE MG/DL
LEUKOCYTES: NEGATIVE
MULTISTIX EXPIRATION DATE: 1923 DATE
MULTISTIX LOT#: 4023 NUMERIC
NITRITE, URINE: NEGATIVE
PH, URINE: 6 (ref 4.5–8)
UROBILINOGEN,SEMI-QN: 0.2 MG/DL (ref 0–1.9)

## 2023-01-05 PROCEDURE — 81002 URINALYSIS NONAUTO W/O SCOPE: CPT | Performed by: OBSTETRICS & GYNECOLOGY

## 2023-01-05 PROCEDURE — 3078F DIAST BP <80 MM HG: CPT | Performed by: OBSTETRICS & GYNECOLOGY

## 2023-01-05 PROCEDURE — 3074F SYST BP LT 130 MM HG: CPT | Performed by: OBSTETRICS & GYNECOLOGY

## 2023-01-05 NOTE — TELEPHONE ENCOUNTER
[facility] Medicine Daily Progress Note    Date of Service  1/6/2019    Chief Complaint  64 y.o. Male with a h/o lung CA admitted 1/4/2019 with left-sided weakness.    Hospital Course    Found to have brain mass on MRI.  Suspect metastasis.  Anticipating resection on 1/7.       Interval Problem Update  1/5:  Denies pain, shortness of breath, nausea, or vomiting.  VSS.  Persistent left-sided weakness.  Denies numbness or tingling.  No visual changes.  1/6:  No significant improvement of symptoms.  Mild improvement of tremors with steroid.  Anxious about procedure.      Consultants/Specialty  NSG    Code Status  DNR    Disposition  TBD.    Review of Systems  Review of Systems   Constitutional: Negative for chills, fever and malaise/fatigue.   HENT: Negative for congestion and sore throat.    Eyes: Negative for blurred vision.   Respiratory: Negative for cough, shortness of breath and wheezing.    Cardiovascular: Negative for chest pain and leg swelling.   Gastrointestinal: Negative for abdominal pain, diarrhea, nausea and vomiting.   Genitourinary: Negative for dysuria and urgency.   Musculoskeletal: Negative for falls, joint pain and myalgias.   Skin: Negative for rash.   Neurological: Positive for tremors, focal weakness and weakness. Negative for dizziness, tingling, sensory change, speech change and headaches.        Physical Exam  Temp:  [36.7 °C (98.1 °F)-37.2 °C (98.9 °F)] 36.7 °C (98.1 °F)  Pulse:  [62-78] 64  Resp:  [16-17] 16  BP: (113-135)/(69-77) 132/72    Physical Exam   Constitutional: He is oriented to person, place, and time. He appears well-developed and well-nourished.   HENT:   Head: Normocephalic and atraumatic.   Right Ear: External ear normal.   Left Ear: External ear normal.   Eyes: Conjunctivae are normal. No scleral icterus.   Neck: Normal range of motion. No JVD present.   Cardiovascular: Normal rate, normal heart sounds and intact distal pulses.  Exam reveals no friction rub.    No murmur  Order placed,mychart sent, M number provided heard.  Pulmonary/Chest: Effort normal and breath sounds normal. No stridor. No respiratory distress. He has no wheezes. He has no rales.   Abdominal: Soft. Bowel sounds are normal. He exhibits no distension. There is no tenderness. There is no rebound and no guarding.   Musculoskeletal: Normal range of motion. He exhibits no edema or deformity.   Neurological: He is alert and oriented to person, place, and time. No cranial nerve deficit.   LUE strength 4/5  LLE strength 5/5   Skin: Skin is warm and dry. He is not diaphoretic. No erythema.   Psychiatric: He has a normal mood and affect.   Nursing note and vitals reviewed.      Fluids    Intake/Output Summary (Last 24 hours) at 01/06/19 1217  Last data filed at 01/06/19 0400   Gross per 24 hour   Intake              300 ml   Output                0 ml   Net              300 ml       Laboratory  Recent Labs      01/04/19   2210  01/06/19   0404   WBC  5.0  10.8   RBC  3.77*  4.01*   HEMOGLOBIN  12.7*  13.6*   HEMATOCRIT  38.2*  39.6*   MCV  101.3*  98.8*   MCH  33.7*  33.9*   MCHC  33.2*  34.3   RDW  44.6  43.2   PLATELETCT  161*  178   MPV  8.7*  8.7*     Recent Labs      01/04/19   2210   SODIUM  141   POTASSIUM  4.5   CHLORIDE  109   CO2  22   GLUCOSE  102*   BUN  20   CREATININE  0.80   CALCIUM  8.9     Recent Labs      01/04/19   2210   INR  1.03               Imaging  OUTSIDE IMAGES-MR BRAIN   Final Result      OUTSIDE IMAGES-MR BRAIN   Final Result      OUTSIDE IMAGES-MR CERVICAL SPINE   Final Result      OUTSIDE IMAGES-DX CHEST   Final Result           Assessment/Plan  * Brain metastases (HCC)   Assessment & Plan    Known lung cancer on chemo with Dr. Pérez  Now with suspect metastatic disease to Brain, evidence of vasogenic edema and shift  Continue with IV decadron   Dysphagia screen  Neuro checks   NSG following.  Anticipate resection on 1/7.  Dr. Pérez       Hypothyroidism   Assessment & Plan    Continue with levothyroxine   Recheck tsh     Left-sided  weakness   Assessment & Plan    Continue with neuro checks   This is due to brain metastasis and vasogenic edema  No significant improvement with decadron.     Anemia   Assessment & Plan    Mild, no evidence of bleeding  Cont to monitor      Thrombocytopenia (HCC)   Assessment & Plan    Mild, cont to monitor      Lung cancer (HCC)   Assessment & Plan    Currently on chemotherapy   Follows with Dr. Pérez   Will attempt to update Dr. Pérez.          VTE prophylaxis: SCDs

## 2023-01-05 NOTE — PROGRESS NOTES
ER f/u. Woke up with upper back pain and epigastric pain. Had vomiting. Then had vaginal bleeding. Went to ER. Had ob ultrasound yesterday--+FHT, ?nuchal fluid. VE--scant bloody discharge. 1st ultrasound showed previa. Yesterday's ultrasound did not mention placenta. Wants FTS.   Keep scheduled appt
No

## 2023-01-05 NOTE — TELEPHONE ENCOUNTER
Returned pt call RE scheduling.   No answer  Left Voice mail to call back with [facility] number  [phone]

## 2023-01-13 ENCOUNTER — TELEPHONE (OUTPATIENT)
Dept: OBGYN CLINIC | Facility: CLINIC | Age: 28
End: 2023-01-13

## 2023-01-13 NOTE — TELEPHONE ENCOUNTER
Cannot diagnose a previa in 1st trimester. I would not travel that far if bleeding within 1 week of proposed travel dates.

## 2023-01-13 NOTE — TELEPHONE ENCOUNTER
Patient requesting to speak with regarding a question, no further details. Please call at [phone],KELLY.

## 2023-01-24 ENCOUNTER — ROUTINE PRENATAL (OUTPATIENT)
Dept: OBGYN CLINIC | Facility: CLINIC | Age: 28
End: 2023-01-24

## 2023-01-24 VITALS
HEART RATE: 101 BPM | BODY MASS INDEX: 32 KG/M2 | WEIGHT: 171.81 LBS | SYSTOLIC BLOOD PRESSURE: 119 MMHG | DIASTOLIC BLOOD PRESSURE: 76 MMHG

## 2023-01-24 DIAGNOSIS — Z34.92 ENCOUNTER FOR SUPERVISION OF NORMAL PREGNANCY IN SECOND TRIMESTER, UNSPECIFIED GRAVIDITY: Primary | ICD-10-CM

## 2023-01-24 LAB
APPEARANCE: CLEAR
BILIRUBIN: NEGATIVE
GLUCOSE (URINE DIPSTICK): NEGATIVE MG/DL
KETONES (URINE DIPSTICK): NEGATIVE MG/DL
LEUKOCYTES: NEGATIVE
MULTISTIX LOT#: ABNORMAL NUMERIC
NITRITE, URINE: NEGATIVE
PH, URINE: 5 (ref 4.5–8)
PROTEIN (URINE DIPSTICK): NEGATIVE MG/DL
SPECIFIC GRAVITY: 1.02 (ref 1–1.03)
URINE-COLOR: YELLOW
UROBILINOGEN,SEMI-QN: 0.2 MG/DL (ref 0–1.9)

## 2023-01-24 PROCEDURE — 81002 URINALYSIS NONAUTO W/O SCOPE: CPT | Performed by: OBSTETRICS & GYNECOLOGY

## 2023-01-24 PROCEDURE — 3074F SYST BP LT 130 MM HG: CPT | Performed by: OBSTETRICS & GYNECOLOGY

## 2023-01-24 PROCEDURE — 3078F DIAST BP <80 MM HG: CPT | Performed by: OBSTETRICS & GYNECOLOGY

## 2023-01-25 ENCOUNTER — HOSPITAL ENCOUNTER (OUTPATIENT)
Dept: PERINATAL CARE | Facility: HOSPITAL | Age: 28
Discharge: HOME | End: 2023-01-25
Attending: OBSTETRICS & GYNECOLOGY
Payer: COMMERCIAL

## 2023-01-25 VITALS
DIASTOLIC BLOOD PRESSURE: 81 MMHG | WEIGHT: 172 LBS | SYSTOLIC BLOOD PRESSURE: 133 MMHG | HEART RATE: 102 BPM | BODY MASS INDEX: 33 KG/M2

## 2023-01-25 DIAGNOSIS — O34.01 UTERUS BICORNIS AFFECTING PREGNANCY IN FIRST TRIMESTER: ICD-10-CM

## 2023-01-25 DIAGNOSIS — Z36.9 FIRST TRIMESTER SCREENING: ICD-10-CM

## 2023-01-25 DIAGNOSIS — Q51.3 UTERUS BICORNIS AFFECTING PREGNANCY IN FIRST TRIMESTER: ICD-10-CM

## 2023-01-25 DIAGNOSIS — O09.899 HISTORY OF PRETERM DELIVERY, CURRENTLY PREGNANT: ICD-10-CM

## 2023-01-25 DIAGNOSIS — Z36.9 FIRST TRIMESTER SCREENING: Primary | ICD-10-CM

## 2023-01-25 PROCEDURE — 36415 COLL VENOUS BLD VENIPUNCTURE: CPT

## 2023-01-25 PROCEDURE — 76813 OB US NUCHAL MEAS 1 GEST: CPT | Performed by: OBSTETRICS & GYNECOLOGY

## 2023-02-06 ENCOUNTER — TELEPHONE (OUTPATIENT)
Dept: PERINATAL CARE | Facility: HOSPITAL | Age: 28
End: 2023-02-06

## 2023-02-06 NOTE — TELEPHONE ENCOUNTER
Panorama screening results obt  Reviewed by DR Misa Amezcua    Results:  low risk  Low Risk for Aneuploidies, triploidy and Monosomy X  Fetal Sex: held  Fetal Fraction:5.7%      Pt states understanding  Copy of results sent for scanning into pt record

## 2023-02-21 ENCOUNTER — ROUTINE PRENATAL (OUTPATIENT)
Dept: OBGYN CLINIC | Facility: CLINIC | Age: 28
End: 2023-02-21

## 2023-02-21 VITALS
WEIGHT: 172.13 LBS | DIASTOLIC BLOOD PRESSURE: 79 MMHG | HEART RATE: 113 BPM | BODY MASS INDEX: 33 KG/M2 | SYSTOLIC BLOOD PRESSURE: 116 MMHG

## 2023-02-21 DIAGNOSIS — Z34.91 ENCOUNTER FOR SUPERVISION OF NORMAL PREGNANCY IN FIRST TRIMESTER, UNSPECIFIED GRAVIDITY: Primary | ICD-10-CM

## 2023-02-21 LAB
APPEARANCE: CLEAR
BILIRUBIN: NEGATIVE
GLUCOSE (URINE DIPSTICK): NEGATIVE MG/DL
KETONES (URINE DIPSTICK): NEGATIVE MG/DL
LEUKOCYTES: NEGATIVE
MULTISTIX LOT#: NORMAL NUMERIC
NITRITE, URINE: NEGATIVE
OCCULT BLOOD: NEGATIVE
PH, URINE: 7 (ref 4.5–8)
PROTEIN (URINE DIPSTICK): NEGATIVE MG/DL
SPECIFIC GRAVITY: 1.02 (ref 1–1.03)
URINE-COLOR: YELLOW
UROBILINOGEN,SEMI-QN: 0.2 MG/DL (ref 0–1.9)

## 2023-02-21 PROCEDURE — 3078F DIAST BP <80 MM HG: CPT | Performed by: OBSTETRICS & GYNECOLOGY

## 2023-02-21 PROCEDURE — 3074F SYST BP LT 130 MM HG: CPT | Performed by: OBSTETRICS & GYNECOLOGY

## 2023-02-21 PROCEDURE — 81002 URINALYSIS NONAUTO W/O SCOPE: CPT | Performed by: OBSTETRICS & GYNECOLOGY

## 2023-03-14 ENCOUNTER — HOSPITAL ENCOUNTER (OUTPATIENT)
Dept: PERINATAL CARE | Facility: HOSPITAL | Age: 28
Discharge: HOME | End: 2023-03-14
Attending: OBSTETRICS & GYNECOLOGY
Payer: COMMERCIAL

## 2023-03-14 VITALS
DIASTOLIC BLOOD PRESSURE: 63 MMHG | HEART RATE: 117 BPM | WEIGHT: 172 LBS | SYSTOLIC BLOOD PRESSURE: 123 MMHG | BODY MASS INDEX: 33 KG/M2

## 2023-03-14 DIAGNOSIS — O09.899 HISTORY OF PRETERM DELIVERY, CURRENTLY PREGNANT: ICD-10-CM

## 2023-03-14 DIAGNOSIS — O34.02 CONGENITAL ABNORMALITY OF UTERUS DURING PREGNANCY IN SECOND TRIMESTER: ICD-10-CM

## 2023-03-14 DIAGNOSIS — O09.899 HISTORY OF PRETERM DELIVERY, CURRENTLY PREGNANT: Primary | ICD-10-CM

## 2023-03-14 DIAGNOSIS — Z36.3 ENCOUNTER FOR ANTENATAL SCREENING FOR MALFORMATION USING ULTRASOUND: ICD-10-CM

## 2023-03-14 PROCEDURE — 76811 OB US DETAILED SNGL FETUS: CPT | Performed by: OBSTETRICS & GYNECOLOGY

## 2023-03-21 ENCOUNTER — ROUTINE PRENATAL (OUTPATIENT)
Dept: OBGYN CLINIC | Facility: CLINIC | Age: 28
End: 2023-03-21

## 2023-03-21 VITALS
HEART RATE: 108 BPM | BODY MASS INDEX: 33 KG/M2 | DIASTOLIC BLOOD PRESSURE: 75 MMHG | SYSTOLIC BLOOD PRESSURE: 116 MMHG | WEIGHT: 174 LBS

## 2023-03-21 DIAGNOSIS — Z34.91 ENCOUNTER FOR SUPERVISION OF NORMAL PREGNANCY IN FIRST TRIMESTER, UNSPECIFIED GRAVIDITY: Primary | ICD-10-CM

## 2023-03-21 LAB
BILIRUBIN: NEGATIVE
GLUCOSE (URINE DIPSTICK): NEGATIVE MG/DL
KETONES (URINE DIPSTICK): NEGATIVE MG/DL
LEUKOCYTES: NEGATIVE
MULTISTIX EXPIRATION DATE: NORMAL DATE
MULTISTIX LOT#: 57 NUMERIC
NITRITE, URINE: NEGATIVE
OCCULT BLOOD: NEGATIVE
PH, URINE: 6 (ref 4.5–8)
PROTEIN (URINE DIPSTICK): NEGATIVE MG/DL
SPECIFIC GRAVITY: 1.02 (ref 1–1.03)
UROBILINOGEN,SEMI-QN: 0.2 MG/DL (ref 0–1.9)

## 2023-03-21 PROCEDURE — 81002 URINALYSIS NONAUTO W/O SCOPE: CPT | Performed by: OBSTETRICS & GYNECOLOGY

## 2023-03-21 PROCEDURE — 3074F SYST BP LT 130 MM HG: CPT | Performed by: OBSTETRICS & GYNECOLOGY

## 2023-03-21 PROCEDURE — 3078F DIAST BP <80 MM HG: CPT | Performed by: OBSTETRICS & GYNECOLOGY

## 2023-03-23 PROBLEM — O34.00 BICORNUATE UTERUS AFFECTING PREGNANCY, ANTEPARTUM: Status: ACTIVE | Noted: 2023-03-23

## 2023-03-23 PROBLEM — Q51.3 BICORNUATE UTERUS AFFECTING PREGNANCY, ANTEPARTUM (HCC): Status: ACTIVE | Noted: 2023-03-23

## 2023-03-23 PROBLEM — Q51.3 BICORNUATE UTERUS AFFECTING PREGNANCY, ANTEPARTUM: Status: ACTIVE | Noted: 2023-03-23

## 2023-03-23 PROBLEM — O34.00 BICORNUATE UTERUS AFFECTING PREGNANCY, ANTEPARTUM (HCC): Status: ACTIVE | Noted: 2023-03-23

## 2023-04-18 ENCOUNTER — ROUTINE PRENATAL (OUTPATIENT)
Dept: OBGYN CLINIC | Facility: CLINIC | Age: 28
End: 2023-04-18

## 2023-04-18 VITALS
WEIGHT: 177.19 LBS | BODY MASS INDEX: 33 KG/M2 | HEART RATE: 108 BPM | DIASTOLIC BLOOD PRESSURE: 71 MMHG | SYSTOLIC BLOOD PRESSURE: 106 MMHG

## 2023-04-18 DIAGNOSIS — Z34.92 ENCOUNTER FOR SUPERVISION OF NORMAL PREGNANCY IN SECOND TRIMESTER, UNSPECIFIED GRAVIDITY: Primary | ICD-10-CM

## 2023-04-18 LAB
BILIRUBIN: NEGATIVE
GLUCOSE (URINE DIPSTICK): NEGATIVE MG/DL
KETONES (URINE DIPSTICK): NEGATIVE MG/DL
MULTISTIX LOT#: ABNORMAL NUMERIC
NITRITE, URINE: NEGATIVE
OCCULT BLOOD: NEGATIVE
PH, URINE: 6 (ref 4.5–8)
PROTEIN (URINE DIPSTICK): NEGATIVE MG/DL
SPECIFIC GRAVITY: 1.01 (ref 1–1.03)
UROBILINOGEN,SEMI-QN: 0.2 MG/DL (ref 0–1.9)

## 2023-04-18 PROCEDURE — 3074F SYST BP LT 130 MM HG: CPT | Performed by: OBSTETRICS & GYNECOLOGY

## 2023-04-18 PROCEDURE — 3078F DIAST BP <80 MM HG: CPT | Performed by: OBSTETRICS & GYNECOLOGY

## 2023-04-18 PROCEDURE — 81002 URINALYSIS NONAUTO W/O SCOPE: CPT | Performed by: OBSTETRICS & GYNECOLOGY

## 2023-05-02 ENCOUNTER — LAB ENCOUNTER (OUTPATIENT)
Dept: LAB | Facility: HOSPITAL | Age: 28
End: 2023-05-02
Attending: OBSTETRICS & GYNECOLOGY
Payer: COMMERCIAL

## 2023-05-02 ENCOUNTER — ROUTINE PRENATAL (OUTPATIENT)
Dept: OBGYN CLINIC | Facility: CLINIC | Age: 28
End: 2023-05-02

## 2023-05-02 VITALS
BODY MASS INDEX: 34 KG/M2 | SYSTOLIC BLOOD PRESSURE: 112 MMHG | HEART RATE: 98 BPM | DIASTOLIC BLOOD PRESSURE: 75 MMHG | WEIGHT: 179.38 LBS

## 2023-05-02 DIAGNOSIS — Z34.92 ENCOUNTER FOR SUPERVISION OF NORMAL PREGNANCY IN SECOND TRIMESTER, UNSPECIFIED GRAVIDITY: Primary | ICD-10-CM

## 2023-05-02 DIAGNOSIS — Z34.92 ENCOUNTER FOR SUPERVISION OF NORMAL PREGNANCY IN SECOND TRIMESTER, UNSPECIFIED GRAVIDITY: ICD-10-CM

## 2023-05-02 LAB
APPEARANCE: CLEAR
BILIRUBIN: NEGATIVE
DEPRECATED RDW RBC AUTO: 52.3 FL (ref 35.1–46.3)
ERYTHROCYTE [DISTWIDTH] IN BLOOD BY AUTOMATED COUNT: 15.8 % (ref 11–15)
GLUCOSE (URINE DIPSTICK): NEGATIVE MG/DL
GLUCOSE 1H P GLC SERPL-MCNC: 109 MG/DL
HCT VFR BLD AUTO: 33.9 %
HGB BLD-MCNC: 10.4 G/DL
KETONES (URINE DIPSTICK): NEGATIVE MG/DL
LEUKOCYTES: NEGATIVE
MCH RBC QN AUTO: 28.1 PG (ref 26–34)
MCHC RBC AUTO-ENTMCNC: 30.7 G/DL (ref 31–37)
MCV RBC AUTO: 91.6 FL
MULTISTIX LOT#: ABNORMAL NUMERIC
NITRITE, URINE: NEGATIVE
OCCULT BLOOD: NEGATIVE
PH, URINE: 5 (ref 4.5–8)
PLATELET # BLD AUTO: 287 10(3)UL (ref 150–450)
PROTEIN (URINE DIPSTICK): 30 MG/DL
RBC # BLD AUTO: 3.7 X10(6)UL
SPECIFIC GRAVITY: 1.02 (ref 1–1.03)
URINE-COLOR: YELLOW
UROBILINOGEN,SEMI-QN: 0.2 MG/DL (ref 0–1.9)
WBC # BLD AUTO: 9.7 X10(3) UL (ref 4–11)

## 2023-05-02 PROCEDURE — 3078F DIAST BP <80 MM HG: CPT | Performed by: OBSTETRICS & GYNECOLOGY

## 2023-05-02 PROCEDURE — 90471 IMMUNIZATION ADMIN: CPT | Performed by: OBSTETRICS & GYNECOLOGY

## 2023-05-02 PROCEDURE — 81002 URINALYSIS NONAUTO W/O SCOPE: CPT | Performed by: OBSTETRICS & GYNECOLOGY

## 2023-05-02 PROCEDURE — 3074F SYST BP LT 130 MM HG: CPT | Performed by: OBSTETRICS & GYNECOLOGY

## 2023-05-02 PROCEDURE — 82950 GLUCOSE TEST: CPT

## 2023-05-02 PROCEDURE — 36415 COLL VENOUS BLD VENIPUNCTURE: CPT

## 2023-05-02 PROCEDURE — 90715 TDAP VACCINE 7 YRS/> IM: CPT | Performed by: OBSTETRICS & GYNECOLOGY

## 2023-05-02 PROCEDURE — 85027 COMPLETE CBC AUTOMATED: CPT

## 2023-05-04 PROBLEM — O99.012 ANEMIA DURING PREGNANCY IN SECOND TRIMESTER: Status: ACTIVE | Noted: 2023-05-04

## 2023-05-04 PROBLEM — O99.012 ANEMIA DURING PREGNANCY IN SECOND TRIMESTER (HCC): Status: ACTIVE | Noted: 2023-05-04

## 2023-05-09 ENCOUNTER — HOSPITAL ENCOUNTER (OUTPATIENT)
Dept: PERINATAL CARE | Facility: HOSPITAL | Age: 28
Discharge: HOME | End: 2023-05-09
Attending: OBSTETRICS & GYNECOLOGY
Payer: COMMERCIAL

## 2023-05-09 VITALS
SYSTOLIC BLOOD PRESSURE: 119 MMHG | HEART RATE: 107 BPM | WEIGHT: 179 LBS | BODY MASS INDEX: 34 KG/M2 | DIASTOLIC BLOOD PRESSURE: 73 MMHG

## 2023-05-09 DIAGNOSIS — O34.00 BICORNUATE UTERUS AFFECTING PREGNANCY, ANTEPARTUM: ICD-10-CM

## 2023-05-09 DIAGNOSIS — O09.899 HISTORY OF PRETERM DELIVERY, CURRENTLY PREGNANT: ICD-10-CM

## 2023-05-09 DIAGNOSIS — Q51.3 BICORNUATE UTERUS AFFECTING PREGNANCY, ANTEPARTUM: Primary | ICD-10-CM

## 2023-05-09 DIAGNOSIS — O34.00 BICORNUATE UTERUS AFFECTING PREGNANCY, ANTEPARTUM: Primary | ICD-10-CM

## 2023-05-09 DIAGNOSIS — Q51.3 BICORNUATE UTERUS AFFECTING PREGNANCY, ANTEPARTUM: ICD-10-CM

## 2023-05-09 PROCEDURE — 76816 OB US FOLLOW-UP PER FETUS: CPT | Performed by: OBSTETRICS & GYNECOLOGY

## 2023-05-16 ENCOUNTER — ROUTINE PRENATAL (OUTPATIENT)
Dept: OBGYN CLINIC | Facility: CLINIC | Age: 28
End: 2023-05-16

## 2023-05-16 VITALS
DIASTOLIC BLOOD PRESSURE: 73 MMHG | HEART RATE: 109 BPM | WEIGHT: 181 LBS | SYSTOLIC BLOOD PRESSURE: 114 MMHG | BODY MASS INDEX: 34 KG/M2

## 2023-05-16 DIAGNOSIS — Z34.93 ENCOUNTER FOR SUPERVISION OF NORMAL PREGNANCY IN THIRD TRIMESTER, UNSPECIFIED GRAVIDITY: Primary | ICD-10-CM

## 2023-05-16 LAB
BILIRUBIN: NEGATIVE
GLUCOSE (URINE DIPSTICK): NEGATIVE MG/DL
KETONES (URINE DIPSTICK): NEGATIVE MG/DL
MULTISTIX LOT#: 57 NUMERIC
NITRITE, URINE: NEGATIVE
OCCULT BLOOD: NEGATIVE
PH, URINE: 6 (ref 4.5–8)
PROTEIN (URINE DIPSTICK): NEGATIVE MG/DL
SPECIFIC GRAVITY: 1.02 (ref 1–1.03)
UROBILINOGEN,SEMI-QN: 0.2 MG/DL (ref 0–1.9)

## 2023-05-16 PROCEDURE — 3074F SYST BP LT 130 MM HG: CPT | Performed by: OBSTETRICS & GYNECOLOGY

## 2023-05-16 PROCEDURE — 3078F DIAST BP <80 MM HG: CPT | Performed by: OBSTETRICS & GYNECOLOGY

## 2023-05-16 PROCEDURE — 81002 URINALYSIS NONAUTO W/O SCOPE: CPT | Performed by: OBSTETRICS & GYNECOLOGY

## 2023-05-30 ENCOUNTER — ROUTINE PRENATAL (OUTPATIENT)
Dept: OBGYN CLINIC | Facility: CLINIC | Age: 28
End: 2023-05-30

## 2023-05-30 VITALS
HEART RATE: 116 BPM | WEIGHT: 183.63 LBS | SYSTOLIC BLOOD PRESSURE: 111 MMHG | BODY MASS INDEX: 35 KG/M2 | DIASTOLIC BLOOD PRESSURE: 75 MMHG

## 2023-05-30 DIAGNOSIS — Z34.93 ENCOUNTER FOR SUPERVISION OF NORMAL PREGNANCY IN THIRD TRIMESTER, UNSPECIFIED GRAVIDITY: Primary | ICD-10-CM

## 2023-05-30 LAB
APPEARANCE: CLEAR
BILIRUBIN: NEGATIVE
GLUCOSE (URINE DIPSTICK): NEGATIVE MG/DL
KETONES (URINE DIPSTICK): NEGATIVE MG/DL
LEUKOCYTES: NEGATIVE
MULTISTIX LOT#: NORMAL NUMERIC
NITRITE, URINE: NEGATIVE
OCCULT BLOOD: NEGATIVE
PH, URINE: 5 (ref 4.5–8)
SPECIFIC GRAVITY: 1.02 (ref 1–1.03)
URINE-COLOR: YELLOW
UROBILINOGEN,SEMI-QN: 0.2 MG/DL (ref 0–1.9)

## 2023-05-30 PROCEDURE — 81002 URINALYSIS NONAUTO W/O SCOPE: CPT | Performed by: OBSTETRICS & GYNECOLOGY

## 2023-05-30 PROCEDURE — 3074F SYST BP LT 130 MM HG: CPT | Performed by: OBSTETRICS & GYNECOLOGY

## 2023-05-30 PROCEDURE — 3078F DIAST BP <80 MM HG: CPT | Performed by: OBSTETRICS & GYNECOLOGY

## 2023-06-13 ENCOUNTER — ROUTINE PRENATAL (OUTPATIENT)
Dept: OBGYN CLINIC | Facility: CLINIC | Age: 28
End: 2023-06-13

## 2023-06-13 VITALS
WEIGHT: 185.81 LBS | BODY MASS INDEX: 35 KG/M2 | SYSTOLIC BLOOD PRESSURE: 107 MMHG | DIASTOLIC BLOOD PRESSURE: 67 MMHG | HEART RATE: 112 BPM

## 2023-06-13 DIAGNOSIS — Z34.93 ENCOUNTER FOR SUPERVISION OF NORMAL PREGNANCY IN THIRD TRIMESTER, UNSPECIFIED GRAVIDITY: Primary | ICD-10-CM

## 2023-06-13 LAB
BILIRUBIN: NEGATIVE
GLUCOSE (URINE DIPSTICK): NEGATIVE MG/DL
KETONES (URINE DIPSTICK): NEGATIVE MG/DL
MULTISTIX LOT#: ABNORMAL NUMERIC
NITRITE, URINE: NEGATIVE
OCCULT BLOOD: NEGATIVE
PH, URINE: 6 (ref 4.5–8)
SPECIFIC GRAVITY: 1.02 (ref 1–1.03)
UROBILINOGEN,SEMI-QN: 0.2 MG/DL (ref 0–1.9)

## 2023-06-13 PROCEDURE — 3074F SYST BP LT 130 MM HG: CPT | Performed by: OBSTETRICS & GYNECOLOGY

## 2023-06-13 PROCEDURE — 3078F DIAST BP <80 MM HG: CPT | Performed by: OBSTETRICS & GYNECOLOGY

## 2023-06-13 PROCEDURE — 81002 URINALYSIS NONAUTO W/O SCOPE: CPT | Performed by: OBSTETRICS & GYNECOLOGY

## 2023-06-20 ENCOUNTER — HOSPITAL ENCOUNTER (OUTPATIENT)
Dept: PERINATAL CARE | Facility: HOSPITAL | Age: 28
Discharge: HOME | End: 2023-06-20
Attending: OBSTETRICS & GYNECOLOGY
Payer: COMMERCIAL

## 2023-06-20 VITALS
HEART RATE: 92 BPM | DIASTOLIC BLOOD PRESSURE: 58 MMHG | SYSTOLIC BLOOD PRESSURE: 113 MMHG | WEIGHT: 185 LBS | BODY MASS INDEX: 35 KG/M2

## 2023-06-20 DIAGNOSIS — O09.899 HISTORY OF PRETERM DELIVERY, CURRENTLY PREGNANT: Primary | ICD-10-CM

## 2023-06-20 DIAGNOSIS — O34.00 BICORNUATE UTERUS AFFECTING PREGNANCY, ANTEPARTUM: ICD-10-CM

## 2023-06-20 DIAGNOSIS — Q51.3 BICORNUATE UTERUS AFFECTING PREGNANCY, ANTEPARTUM: ICD-10-CM

## 2023-06-20 DIAGNOSIS — O09.899 HISTORY OF PRETERM DELIVERY, CURRENTLY PREGNANT: ICD-10-CM

## 2023-06-20 PROCEDURE — 76816 OB US FOLLOW-UP PER FETUS: CPT | Performed by: OBSTETRICS & GYNECOLOGY

## 2023-06-20 PROCEDURE — 76819 FETAL BIOPHYS PROFIL W/O NST: CPT

## 2023-06-27 ENCOUNTER — TELEPHONE (OUTPATIENT)
Dept: OBGYN CLINIC | Facility: CLINIC | Age: 28
End: 2023-06-27

## 2023-06-27 ENCOUNTER — LAB ENCOUNTER (OUTPATIENT)
Dept: LAB | Facility: HOSPITAL | Age: 28
End: 2023-06-27
Attending: OBSTETRICS & GYNECOLOGY
Payer: COMMERCIAL

## 2023-06-27 ENCOUNTER — ROUTINE PRENATAL (OUTPATIENT)
Dept: OBGYN CLINIC | Facility: CLINIC | Age: 28
End: 2023-06-27

## 2023-06-27 VITALS
BODY MASS INDEX: 36 KG/M2 | SYSTOLIC BLOOD PRESSURE: 107 MMHG | HEART RATE: 101 BPM | WEIGHT: 188 LBS | DIASTOLIC BLOOD PRESSURE: 72 MMHG

## 2023-06-27 DIAGNOSIS — Z34.93 ENCOUNTER FOR SUPERVISION OF NORMAL PREGNANCY IN THIRD TRIMESTER, UNSPECIFIED GRAVIDITY: Primary | ICD-10-CM

## 2023-06-27 LAB
BILIRUBIN: NEGATIVE
DEPRECATED RDW RBC AUTO: 57.1 FL (ref 35.1–46.3)
ERYTHROCYTE [DISTWIDTH] IN BLOOD BY AUTOMATED COUNT: 17.6 % (ref 11–15)
GLUCOSE (URINE DIPSTICK): NEGATIVE MG/DL
HCT VFR BLD AUTO: 34.5 %
HGB BLD-MCNC: 10.9 G/DL
KETONES (URINE DIPSTICK): NEGATIVE MG/DL
MCH RBC QN AUTO: 28.3 PG (ref 26–34)
MCHC RBC AUTO-ENTMCNC: 31.6 G/DL (ref 31–37)
MCV RBC AUTO: 89.6 FL
MULTISTIX LOT#: ABNORMAL NUMERIC
NITRITE, URINE: NEGATIVE
OCCULT BLOOD: NEGATIVE
PH, URINE: 6 (ref 4.5–8)
PLATELET # BLD AUTO: 274 10(3)UL (ref 150–450)
RBC # BLD AUTO: 3.85 X10(6)UL
SPECIFIC GRAVITY: 1.02 (ref 1–1.03)
UROBILINOGEN,SEMI-QN: 0.2 MG/DL (ref 0–1.9)
WBC # BLD AUTO: 7.8 X10(3) UL (ref 4–11)

## 2023-06-27 PROCEDURE — 87389 HIV-1 AG W/HIV-1&-2 AB AG IA: CPT | Performed by: OBSTETRICS & GYNECOLOGY

## 2023-06-27 PROCEDURE — 81002 URINALYSIS NONAUTO W/O SCOPE: CPT | Performed by: OBSTETRICS & GYNECOLOGY

## 2023-06-27 PROCEDURE — 3074F SYST BP LT 130 MM HG: CPT | Performed by: OBSTETRICS & GYNECOLOGY

## 2023-06-27 PROCEDURE — 85027 COMPLETE CBC AUTOMATED: CPT | Performed by: OBSTETRICS & GYNECOLOGY

## 2023-06-27 PROCEDURE — 3078F DIAST BP <80 MM HG: CPT | Performed by: OBSTETRICS & GYNECOLOGY

## 2023-06-27 PROCEDURE — 36415 COLL VENOUS BLD VENIPUNCTURE: CPT | Performed by: OBSTETRICS & GYNECOLOGY

## 2023-06-27 PROCEDURE — 86780 TREPONEMA PALLIDUM: CPT | Performed by: OBSTETRICS & GYNECOLOGY

## 2023-06-28 LAB — T PALLIDUM AB SER QL: NEGATIVE

## 2023-07-03 ENCOUNTER — ROUTINE PRENATAL (OUTPATIENT)
Dept: OBGYN CLINIC | Facility: CLINIC | Age: 28
End: 2023-07-03

## 2023-07-03 VITALS
DIASTOLIC BLOOD PRESSURE: 74 MMHG | HEART RATE: 109 BPM | WEIGHT: 187 LBS | BODY MASS INDEX: 35 KG/M2 | SYSTOLIC BLOOD PRESSURE: 108 MMHG

## 2023-07-03 DIAGNOSIS — Z34.93 ENCOUNTER FOR SUPERVISION OF NORMAL PREGNANCY IN THIRD TRIMESTER, UNSPECIFIED GRAVIDITY: Primary | ICD-10-CM

## 2023-07-03 LAB
BILIRUBIN: NEGATIVE
GLUCOSE (URINE DIPSTICK): NEGATIVE MG/DL
KETONES (URINE DIPSTICK): NEGATIVE MG/DL
LEUKOCYTES: NEGATIVE
MULTISTIX LOT#: 57 NUMERIC
NITRITE, URINE: NEGATIVE
OCCULT BLOOD: NEGATIVE
PH, URINE: 6 (ref 4.5–8)
SPECIFIC GRAVITY: 1.02 (ref 1–1.03)
UROBILINOGEN,SEMI-QN: 0.2 MG/DL (ref 0–1.9)

## 2023-07-03 PROCEDURE — 3074F SYST BP LT 130 MM HG: CPT | Performed by: OBSTETRICS & GYNECOLOGY

## 2023-07-03 PROCEDURE — 81002 URINALYSIS NONAUTO W/O SCOPE: CPT | Performed by: OBSTETRICS & GYNECOLOGY

## 2023-07-03 PROCEDURE — 3078F DIAST BP <80 MM HG: CPT | Performed by: OBSTETRICS & GYNECOLOGY

## 2023-07-05 LAB — GROUP B STREP BY PCR FOR PCR OVT: NEGATIVE

## 2023-07-11 ENCOUNTER — ROUTINE PRENATAL (OUTPATIENT)
Dept: OBGYN CLINIC | Facility: CLINIC | Age: 28
End: 2023-07-11

## 2023-07-11 VITALS
SYSTOLIC BLOOD PRESSURE: 112 MMHG | WEIGHT: 190.81 LBS | HEART RATE: 106 BPM | DIASTOLIC BLOOD PRESSURE: 76 MMHG | BODY MASS INDEX: 36 KG/M2

## 2023-07-11 DIAGNOSIS — Z34.91 ENCOUNTER FOR SUPERVISION OF NORMAL PREGNANCY IN FIRST TRIMESTER, UNSPECIFIED GRAVIDITY: Primary | ICD-10-CM

## 2023-07-11 LAB
APPEARANCE: CLEAR
BILIRUBIN: NEGATIVE
GLUCOSE (URINE DIPSTICK): NEGATIVE MG/DL
MULTISTIX LOT#: ABNORMAL NUMERIC
NITRITE, URINE: NEGATIVE
OCCULT BLOOD: NEGATIVE
PH, URINE: 6 (ref 4.5–8)
PROTEIN (URINE DIPSTICK): 30 MG/DL
SPECIFIC GRAVITY: 1.02 (ref 1–1.03)
URINE-COLOR: YELLOW
UROBILINOGEN,SEMI-QN: 0.2 MG/DL (ref 0–1.9)

## 2023-07-11 PROCEDURE — 81002 URINALYSIS NONAUTO W/O SCOPE: CPT | Performed by: OBSTETRICS & GYNECOLOGY

## 2023-07-11 PROCEDURE — 3078F DIAST BP <80 MM HG: CPT | Performed by: OBSTETRICS & GYNECOLOGY

## 2023-07-11 PROCEDURE — 3074F SYST BP LT 130 MM HG: CPT | Performed by: OBSTETRICS & GYNECOLOGY

## 2023-07-12 ENCOUNTER — TELEPHONE (OUTPATIENT)
Dept: OBGYN CLINIC | Facility: CLINIC | Age: 28
End: 2023-07-12

## 2023-07-12 DIAGNOSIS — Z34.93 ENCOUNTER FOR SUPERVISION OF NORMAL PREGNANCY IN THIRD TRIMESTER, UNSPECIFIED GRAVIDITY: Primary | ICD-10-CM

## 2023-07-12 NOTE — TELEPHONE ENCOUNTER
----- Message from Delilah Sears sent at 6/27/2023  9:19 AM CDT -----  Regarding: Primary  Please place pre-op orders for section scheduled on Wed,07/26 with CAP

## 2023-07-15 ENCOUNTER — ANESTHESIA (OUTPATIENT)
Dept: OBGYN UNIT | Facility: HOSPITAL | Age: 28
End: 2023-07-15
Payer: COMMERCIAL

## 2023-07-15 ENCOUNTER — ANESTHESIA EVENT (OUTPATIENT)
Dept: OBGYN UNIT | Facility: HOSPITAL | Age: 28
End: 2023-07-15
Payer: COMMERCIAL

## 2023-07-15 ENCOUNTER — HOSPITAL ENCOUNTER (INPATIENT)
Facility: HOSPITAL | Age: 28
LOS: 3 days | Discharge: HOME | End: 2023-07-18
Attending: OBSTETRICS & GYNECOLOGY | Admitting: OBSTETRICS & GYNECOLOGY
Payer: COMMERCIAL

## 2023-07-15 ENCOUNTER — TELEPHONE (OUTPATIENT)
Dept: OBGYN CLINIC | Facility: CLINIC | Age: 28
End: 2023-07-15

## 2023-07-15 PROBLEM — Z34.90 PREGNANCY: Status: ACTIVE | Noted: 2023-07-15

## 2023-07-15 PROBLEM — Z34.90 PREGNANCY (HCC): Status: ACTIVE | Noted: 2023-07-15

## 2023-07-15 LAB
BASOPHILS # BLD AUTO: 0.02 X10(3) UL (ref 0–0.2)
BASOPHILS NFR BLD AUTO: 0.2 %
DEPRECATED RDW RBC AUTO: 58.2 FL (ref 35.1–46.3)
EOSINOPHIL # BLD AUTO: 0.05 X10(3) UL (ref 0–0.7)
EOSINOPHIL NFR BLD AUTO: 0.6 %
ERYTHROCYTE [DISTWIDTH] IN BLOOD BY AUTOMATED COUNT: 17.7 % (ref 11–15)
HCT VFR BLD AUTO: 37.9 %
HGB BLD-MCNC: 12.2 G/DL
IMM GRANULOCYTES # BLD AUTO: 0.08 X10(3) UL (ref 0–1)
IMM GRANULOCYTES NFR BLD: 0.9 %
LYMPHOCYTES # BLD AUTO: 1.6 X10(3) UL (ref 1–4)
LYMPHOCYTES NFR BLD AUTO: 17.8 %
MCH RBC QN AUTO: 28.9 PG (ref 26–34)
MCHC RBC AUTO-ENTMCNC: 32.2 G/DL (ref 31–37)
MCV RBC AUTO: 89.8 FL
MONOCYTES # BLD AUTO: 0.87 X10(3) UL (ref 0.1–1)
MONOCYTES NFR BLD AUTO: 9.7 %
NEUTROPHILS # BLD AUTO: 6.39 X10 (3) UL (ref 1.5–7.7)
NEUTROPHILS # BLD AUTO: 6.39 X10(3) UL (ref 1.5–7.7)
NEUTROPHILS NFR BLD AUTO: 70.8 %
PLATELET # BLD AUTO: 263 10(3)UL (ref 150–450)
RBC # BLD AUTO: 4.22 X10(6)UL
WBC # BLD AUTO: 9 X10(3) UL (ref 4–11)

## 2023-07-15 RX ORDER — ONDANSETRON 2 MG/ML
4 INJECTION INTRAMUSCULAR; INTRAVENOUS EVERY 6 HOURS PRN
Status: DISCONTINUED | OUTPATIENT
Start: 2023-07-15 | End: 2023-07-16 | Stop reason: HOSPADM

## 2023-07-15 RX ORDER — FAMOTIDINE 20 MG/1
20 TABLET, FILM COATED ORAL ONCE
Status: COMPLETED | OUTPATIENT
Start: 2023-07-15 | End: 2023-07-15

## 2023-07-15 RX ORDER — TRISODIUM CITRATE DIHYDRATE AND CITRIC ACID MONOHYDRATE 500; 334 MG/5ML; MG/5ML
30 SOLUTION ORAL ONCE
Status: COMPLETED | OUTPATIENT
Start: 2023-07-15 | End: 2023-07-15

## 2023-07-15 RX ORDER — SODIUM CHLORIDE, SODIUM LACTATE, POTASSIUM CHLORIDE, CALCIUM CHLORIDE 600; 310; 30; 20 MG/100ML; MG/100ML; MG/100ML; MG/100ML
125 INJECTION, SOLUTION INTRAVENOUS CONTINUOUS
Status: DISCONTINUED | OUTPATIENT
Start: 2023-07-15 | End: 2023-07-16 | Stop reason: HOSPADM

## 2023-07-15 RX ORDER — FAMOTIDINE 10 MG/ML
20 INJECTION, SOLUTION INTRAVENOUS ONCE
Status: COMPLETED | OUTPATIENT
Start: 2023-07-15 | End: 2023-07-15

## 2023-07-15 RX ORDER — METOCLOPRAMIDE 10 MG/1
10 TABLET ORAL ONCE
Status: DISCONTINUED | OUTPATIENT
Start: 2023-07-15 | End: 2023-07-16 | Stop reason: HOSPADM

## 2023-07-15 RX ORDER — METOCLOPRAMIDE HYDROCHLORIDE 5 MG/ML
10 INJECTION INTRAMUSCULAR; INTRAVENOUS ONCE
Status: DISCONTINUED | OUTPATIENT
Start: 2023-07-15 | End: 2023-07-16 | Stop reason: HOSPADM

## 2023-07-15 RX ORDER — BUPIVACAINE HYDROCHLORIDE 7.5 MG/ML
INJECTION, SOLUTION INTRASPINAL AS NEEDED
Status: DISCONTINUED | OUTPATIENT
Start: 2023-07-15 | End: 2023-07-16 | Stop reason: SURG

## 2023-07-15 RX ORDER — ACETAMINOPHEN 500 MG
1000 TABLET ORAL ONCE
Status: COMPLETED | OUTPATIENT
Start: 2023-07-15 | End: 2023-07-15

## 2023-07-15 RX ORDER — MORPHINE SULFATE 1 MG/ML
INJECTION, SOLUTION EPIDURAL; INTRATHECAL; INTRAVENOUS AS NEEDED
Status: DISCONTINUED | OUTPATIENT
Start: 2023-07-15 | End: 2023-07-16 | Stop reason: SURG

## 2023-07-15 RX ORDER — LIDOCAINE HYDROCHLORIDE 10 MG/ML
INJECTION, SOLUTION EPIDURAL; INFILTRATION; INTRACAUDAL; PERINEURAL AS NEEDED
Status: DISCONTINUED | OUTPATIENT
Start: 2023-07-15 | End: 2023-07-16 | Stop reason: SURG

## 2023-07-15 RX ORDER — CEFAZOLIN SODIUM/WATER 2 G/20 ML
2 SYRINGE (ML) INTRAVENOUS ONCE
Status: COMPLETED | OUTPATIENT
Start: 2023-07-15 | End: 2023-07-15

## 2023-07-15 RX ADMIN — BUPIVACAINE HYDROCHLORIDE 1.6 ML: 7.5 INJECTION, SOLUTION INTRASPINAL at 23:45:00

## 2023-07-15 RX ADMIN — PHENYLEPHRINE HCL 100 MCG: 10 MG/ML VIAL (ML) INJECTION at 23:59:00

## 2023-07-15 RX ADMIN — SODIUM CHLORIDE, SODIUM LACTATE, POTASSIUM CHLORIDE, CALCIUM CHLORIDE: 600; 310; 30; 20 INJECTION, SOLUTION INTRAVENOUS at 23:36:00

## 2023-07-15 RX ADMIN — CEFAZOLIN SODIUM/WATER 2 G: 2 G/20 ML SYRINGE (ML) INTRAVENOUS at 23:47:00

## 2023-07-15 RX ADMIN — LIDOCAINE HYDROCHLORIDE 5 ML: 10 INJECTION, SOLUTION EPIDURAL; INFILTRATION; INTRACAUDAL; PERINEURAL at 23:40:00

## 2023-07-15 RX ADMIN — EPHEDRINE SULFATE 10 MG: 50 INJECTION INTRAVENOUS at 23:50:00

## 2023-07-15 RX ADMIN — MORPHINE SULFATE 0.25 MG: 1 INJECTION, SOLUTION EPIDURAL; INTRATHECAL; INTRAVENOUS at 23:45:00

## 2023-07-16 LAB
ANTIBODY SCREEN: NEGATIVE
RH BLOOD TYPE: POSITIVE

## 2023-07-16 PROCEDURE — 59510 CESAREAN DELIVERY: CPT | Performed by: OBSTETRICS & GYNECOLOGY

## 2023-07-16 PROCEDURE — 59514 CESAREAN DELIVERY ONLY: CPT | Performed by: OBSTETRICS & GYNECOLOGY

## 2023-07-16 RX ORDER — ACETAMINOPHEN 500 MG
1000 TABLET ORAL EVERY 6 HOURS
Status: DISCONTINUED | OUTPATIENT
Start: 2023-07-16 | End: 2023-07-18

## 2023-07-16 RX ORDER — DIPHENHYDRAMINE HYDROCHLORIDE 50 MG/ML
12.5 INJECTION INTRAMUSCULAR; INTRAVENOUS EVERY 4 HOURS PRN
Status: DISPENSED | OUTPATIENT
Start: 2023-07-16 | End: 2023-07-16

## 2023-07-16 RX ORDER — HYDROMORPHONE HYDROCHLORIDE 1 MG/ML
0.6 INJECTION, SOLUTION INTRAMUSCULAR; INTRAVENOUS; SUBCUTANEOUS
Status: ACTIVE | OUTPATIENT
Start: 2023-07-16 | End: 2023-07-16

## 2023-07-16 RX ORDER — DIPHENHYDRAMINE HCL 25 MG
25 CAPSULE ORAL EVERY 4 HOURS PRN
Status: ACTIVE | OUTPATIENT
Start: 2023-07-16 | End: 2023-07-16

## 2023-07-16 RX ORDER — ACETAMINOPHEN 325 MG/1
650 TABLET ORAL EVERY 6 HOURS PRN
Status: ACTIVE | OUTPATIENT
Start: 2023-07-16 | End: 2023-07-16

## 2023-07-16 RX ORDER — HYDROMORPHONE HYDROCHLORIDE 1 MG/ML
0.4 INJECTION, SOLUTION INTRAMUSCULAR; INTRAVENOUS; SUBCUTANEOUS
Status: ACTIVE | OUTPATIENT
Start: 2023-07-16 | End: 2023-07-16

## 2023-07-16 RX ORDER — NALBUPHINE HYDROCHLORIDE 10 MG/ML
2.5 INJECTION, SOLUTION INTRAMUSCULAR; INTRAVENOUS; SUBCUTANEOUS EVERY 4 HOURS PRN
Status: ACTIVE | OUTPATIENT
Start: 2023-07-16 | End: 2023-07-16

## 2023-07-16 RX ORDER — HALOPERIDOL 5 MG/ML
0.5 INJECTION INTRAMUSCULAR ONCE AS NEEDED
Status: ACTIVE | OUTPATIENT
Start: 2023-07-16 | End: 2023-07-16

## 2023-07-16 RX ORDER — NALOXONE HYDROCHLORIDE 0.4 MG/ML
0.08 INJECTION, SOLUTION INTRAMUSCULAR; INTRAVENOUS; SUBCUTANEOUS
Status: ACTIVE | OUTPATIENT
Start: 2023-07-16 | End: 2023-07-16

## 2023-07-16 RX ORDER — HYDROCODONE BITARTRATE AND ACETAMINOPHEN 7.5; 325 MG/1; MG/1
1 TABLET ORAL EVERY 6 HOURS PRN
Status: ACTIVE | OUTPATIENT
Start: 2023-07-16 | End: 2023-07-16

## 2023-07-16 RX ORDER — ONDANSETRON 2 MG/ML
4 INJECTION INTRAMUSCULAR; INTRAVENOUS EVERY 6 HOURS PRN
Status: DISCONTINUED | OUTPATIENT
Start: 2023-07-16 | End: 2023-07-18

## 2023-07-16 RX ORDER — SODIUM CHLORIDE, SODIUM LACTATE, POTASSIUM CHLORIDE, CALCIUM CHLORIDE 600; 310; 30; 20 MG/100ML; MG/100ML; MG/100ML; MG/100ML
INJECTION, SOLUTION INTRAVENOUS CONTINUOUS
OUTPATIENT
Start: 2023-07-16

## 2023-07-16 RX ORDER — ONDANSETRON 2 MG/ML
4 INJECTION INTRAMUSCULAR; INTRAVENOUS ONCE AS NEEDED
Status: ACTIVE | OUTPATIENT
Start: 2023-07-16 | End: 2023-07-16

## 2023-07-16 RX ORDER — PHENYLEPHRINE HCL 10 MG/ML
VIAL (ML) INJECTION AS NEEDED
Status: DISCONTINUED | OUTPATIENT
Start: 2023-07-16 | End: 2023-07-16 | Stop reason: SURG

## 2023-07-16 RX ORDER — GABAPENTIN 300 MG/1
300 CAPSULE ORAL EVERY 8 HOURS PRN
Status: DISCONTINUED | OUTPATIENT
Start: 2023-07-16 | End: 2023-07-18

## 2023-07-16 RX ORDER — ONDANSETRON 2 MG/ML
4 INJECTION INTRAMUSCULAR; INTRAVENOUS ONCE AS NEEDED
OUTPATIENT
Start: 2023-07-16 | End: 2023-07-16

## 2023-07-16 RX ORDER — AMMONIA INHALANTS 0.04 G/.3ML
0.3 INHALANT RESPIRATORY (INHALATION) AS NEEDED
Status: DISCONTINUED | OUTPATIENT
Start: 2023-07-16 | End: 2023-07-18

## 2023-07-16 RX ORDER — PROCHLORPERAZINE EDISYLATE 5 MG/ML
5 INJECTION INTRAMUSCULAR; INTRAVENOUS ONCE AS NEEDED
Status: ACTIVE | OUTPATIENT
Start: 2023-07-16 | End: 2023-07-16

## 2023-07-16 RX ORDER — IBUPROFEN 600 MG/1
600 TABLET ORAL EVERY 6 HOURS
Status: DISCONTINUED | OUTPATIENT
Start: 2023-07-17 | End: 2023-07-18

## 2023-07-16 RX ORDER — KETOROLAC TROMETHAMINE 30 MG/ML
30 INJECTION, SOLUTION INTRAMUSCULAR; INTRAVENOUS ONCE AS NEEDED
OUTPATIENT
Start: 2023-07-16 | End: 2023-07-16

## 2023-07-16 RX ORDER — NALBUPHINE HYDROCHLORIDE 10 MG/ML
2.5 INJECTION, SOLUTION INTRAMUSCULAR; INTRAVENOUS; SUBCUTANEOUS
Status: DISCONTINUED | OUTPATIENT
Start: 2023-07-16 | End: 2023-07-18

## 2023-07-16 RX ORDER — HYDROCODONE BITARTRATE AND ACETAMINOPHEN 7.5; 325 MG/1; MG/1
2 TABLET ORAL EVERY 6 HOURS PRN
Status: ACTIVE | OUTPATIENT
Start: 2023-07-16 | End: 2023-07-16

## 2023-07-16 RX ORDER — DIPHENHYDRAMINE HYDROCHLORIDE 50 MG/ML
25 INJECTION INTRAMUSCULAR; INTRAVENOUS ONCE AS NEEDED
OUTPATIENT
Start: 2023-07-16 | End: 2023-07-16

## 2023-07-16 RX ORDER — EPHEDRINE SULFATE 50 MG/ML
INJECTION INTRAVENOUS AS NEEDED
Status: DISCONTINUED | OUTPATIENT
Start: 2023-07-15 | End: 2023-07-16 | Stop reason: SURG

## 2023-07-16 RX ORDER — BISACODYL 10 MG
10 SUPPOSITORY, RECTAL RECTAL ONCE AS NEEDED
Status: DISCONTINUED | OUTPATIENT
Start: 2023-07-16 | End: 2023-07-18

## 2023-07-16 RX ORDER — DOCUSATE SODIUM 100 MG/1
100 CAPSULE, LIQUID FILLED ORAL
Status: DISCONTINUED | OUTPATIENT
Start: 2023-07-16 | End: 2023-07-18

## 2023-07-16 RX ORDER — SIMETHICONE 80 MG
80 TABLET,CHEWABLE ORAL 3 TIMES DAILY PRN
Status: DISCONTINUED | OUTPATIENT
Start: 2023-07-16 | End: 2023-07-18

## 2023-07-16 RX ORDER — KETOROLAC TROMETHAMINE 30 MG/ML
30 INJECTION, SOLUTION INTRAMUSCULAR; INTRAVENOUS EVERY 6 HOURS
Status: DISPENSED | OUTPATIENT
Start: 2023-07-16 | End: 2023-07-17

## 2023-07-16 RX ADMIN — SODIUM CHLORIDE, SODIUM LACTATE, POTASSIUM CHLORIDE, CALCIUM CHLORIDE: 600; 310; 30; 20 INJECTION, SOLUTION INTRAVENOUS at 00:49:00

## 2023-07-16 RX ADMIN — PHENYLEPHRINE HCL 100 MCG: 10 MG/ML VIAL (ML) INJECTION at 00:12:00

## 2023-07-16 NOTE — TELEPHONE ENCOUNTER
Patient delivered 7/15/2023 Please cancel upcoming appointments. If potential future scheduled CSx, notify MD & assistants.

## 2023-07-16 NOTE — PROGRESS NOTES
Patient transferred to mother/baby room 363 per cart in stable condition with baby and personal belongings. Accompanied by care partner and staff. Report given to mother/baby RN.

## 2023-07-16 NOTE — OPERATIVE REPORT
[facility]     Section Delivery / Operative Note    Rachel Monroe Patient Status:  Surgery Admit - Inpt    1995 MRN [id number]   Location [address] [place] [address] Attending Richard Sanots MD   Hosp Day # 0 PCP Jared Ash DO     Pre Op Diagnosis:  IUP at 37 wks,  breech, SROM      Post Op Diagnosis:  Same as pre-op    Procedure:   primary urgent LTCS     Surgeon:    Dinesh Grimes MD    Assistant Surgeon:  in house Kamryn     Anesthesia:   spinal with duramorph    Complications:  none     Antibiotics:    Ancef 2 grams & azithromycin preoperatively    Blood loss:  pending       Specimens:   placenta and gases    Findings: bicornuate uterus, normal tubes bilaterally, normal ovaries bilaterally. Live male infant, with head in right horn Apgars 9 & 9, weight 7 lbs, 15 oz delivered at [address] [facility] [address] [facility] breech position. Nuchal Cord:  none  clear amniotic fluid noted. Placenta:  Date/Time of Delivery:     Delivery: manual extraction  Placenta to Pathology: yes    Cord:  Cord Gases Submitted: yes  Cord Blood/Tissue Collection: no  Cord Complications: none    Delivery Comment:  SROM 6 hours prior to presenting with known breech    Sponge and Needle Counts:  Verified    Operative note: The patient was prepped and draped in the normal usual sterile fashion after SCDs & valencia catheter placed. A time out was performed. After adequate level of anesthesia was ascertained, a Pfannenstiel skin incision was made and extended down to the level of the fascia. The fascia was incised and extended bilaterally with curved Palumbo scissors. The rectus muscles were  superiorly and inferiorly. The peritoneal cavity was entered with blunt dissection superiorly and extended inferiorly, with good visualization of bladder at all times. A bladder blade was inserted, bladder flap created and bladder blade replaced. The uterus was scored in the midline.  clear amniotic fluid encountered. The lower uterine incision was extended laterally & superiorly with add of bandage scissors. Hips hooked by surgeon's finger in order to guide hips through incision while fundal pressure was applied by assistant. . Each leg flexed & delivered. Body extracted gently to level of shoulders. Each arm reduced. Head flexed & infant delivered in toto. The infant's mouth & naso cavities were bulb suctioned. No nuchal cord noted. The remainder of the body was delivered without complication  while fundal pressure was applied by assistant. .  The infant was vigorously crying. The cord was doubly clamped and cut. The infant was shown to the parents and placed in the radiant warmer. Cord blood was obtained. Manual removal of placenta was performed. The uterus was externalized. Uterus, tubes and ovaries were normal in appearance. The uterine cavity was cleaned of all clots and debris using a wet lap. The cervix was dilated with a ring forcep which was then passed off of the field. The bladder blade was replaced. The edges of uterine incision was grasped with ring forceps. The uterus was closed in two layer fashion, first being interlocking, the second being imbricating using 0-Vicryl. The incision was inspected for hemostasis. The cul de sac was cleared of all clots / debris. The uterus was replaced into the abdominal cavity and the gutters were swept clean. Re-inspection of the hysterotomy, peritomeum and rectus muscles noted them to be entirely hemostatic. The fascia was closed in two halves using 0 Vicryl. The subcutaneous tissue was copiously irrigated and any bleeding cauterized. The subcutaneous tissue was closed using 2-0 plain. The skin was closed using 4-0 vicryl with steristrips applied. Pressure dressing was applied. All sponge, instrument and needle counts were correct x 2. The patient tolerated the procedure well and was taken to recovery room in alert & stable fashion. Mile Jones Carole Rodriguez MD  7/16/2023  12:45 AM

## 2023-07-16 NOTE — PROGRESS NOTES
Pt is a 32year old female admitted to tr1   Patient presents with:  R/o Rom: C/s for breech presentation     Pt is  37w6d intra-uterine pregnancy. History obtained, consents signed. Oriented to room, staff, and plan of care.

## 2023-07-16 NOTE — PLAN OF CARE
Problem: BIRTH - VAGINAL/ SECTION  Goal: Fetal and maternal status remain reassuring during the birth process  Description: INTERVENTIONS:  - Monitor vital signs  - Monitor fetal heart rate  - Monitor uterine activity  - Monitor labor progression (vaginal delivery)  - DVT prophylaxis (C/S delivery)  - Surgical antibiotic prophylaxis (C/S delivery)  Outcome: Completed     Problem: PAIN - ADULT  Goal: Verbalizes/displays adequate comfort level or patient's stated pain goal  Description: INTERVENTIONS:  - Encourage pt to monitor pain and request assistance  - Assess pain using appropriate pain scale  - Administer analgesics based on type and severity of pain and evaluate response  - Implement non-pharmacological measures as appropriate and evaluate response  - Consider cultural and social influences on pain and pain management  - Manage/alleviate anxiety  - Utilize distraction and/or relaxation techniques  - Monitor for opioid side effects  - Notify MD/LIP if interventions unsuccessful or patient reports new pain  - Anticipate increased pain with activity and pre-medicate as appropriate  Outcome: Progressing     Problem: ANXIETY  Goal: Will report anxiety at manageable levels  Description: INTERVENTIONS:  - Administer medication as ordered  - Teach and rehearse alternative coping skills  - Provide emotional support with 1:1 interaction with staff  Outcome: Progressing     Problem: SKIN/TISSUE INTEGRITY - ADULT  Goal: Skin integrity remains intact  Description: INTERVENTIONS  - Assess and document risk factors for pressure ulcer development  - Assess and document skin integrity  - Monitor for areas of redness and/or skin breakdown  - Initiate interventions, skin care algorithm/standards of care as needed  Outcome: Progressing  Goal: Incision(s), wounds(s) or drain site(s) healing without S/S of infection  Description: INTERVENTIONS:  - Assess and document risk factors for pressure ulcer development  - Assess and document skin integrity  - Assess and document dressing/incision, wound bed, drain sites and surrounding tissue  - Implement wound care per orders  - Initiate isolation precautions as appropriate  - Initiate Pressure Ulcer prevention bundle as indicated  Outcome: Progressing  Goal: Oral mucous membranes remain intact  Description: INTERVENTIONS  - Assess oral mucosa and hygiene practices  - Implement preventative oral hygiene regimen  - Implement oral medicated treatments as ordered  Outcome: Progressing     Problem: POSTPARTUM  Goal: Long Term Goal:Experiences normal postpartum course  Description: INTERVENTIONS:  - Assess and monitor vital signs and lab values. - Assess fundus and lochia. - Provide ice/sitz baths for perineum discomfort. - Monitor healing of incision/episiotomy/laceration, and assess for signs and symptoms of infection and hematoma. - Assess bladder function and monitor for bladder distention.  - Provide/instruct/assist with pericare as needed. - Provide VTE prophylaxis as needed. - Monitor bowel function.  - Encourage ambulation and provide assistance as needed. - Assess and monitor emotional status and provide social service/psych resources as needed. - Utilize standard precautions and use personal protective equipment as indicated. Ensure aseptic care of all intravenous lines and invasive tubes/drains.  - Obtain immunization and exposure to communicable diseases history. Outcome: Progressing  Goal: Optimize infant feeding at the breast  Description: INTERVENTIONS:  - Initiate breast feeding within first hour after birth. - Monitor effectiveness of current breast feeding efforts. - Assess support systems available to mother/family.  - Identify cultural beliefs/practices regarding lactation, letdown techniques, maternal food preferences.   - Assess mother's knowledge and previous experience with breast feeding.  - Provide information as needed about early infant feeding cues (e.g., rooting, lip smacking, sucking fingers/hand) versus late cue of crying.  - Discuss/demonstrate breast feeding aids (e.g., infant sling, nursing footstool/pillows, and breast pumps). - Encourage mother/other family members to express feelings/concerns, and actively listen. - Educate father/SO about benefits of breast feeding and how to manage common lactation challenges. - Recommend avoidance of specific medications or substances incompatible with breast feeding.  - Assess and monitor for signs of nipple pain/trauma. - Instruct and provide assistance with proper latch. - Review techniques for milk expression (breast pumping) and storage of breast milk. Provide pumping equipment/supplies, instructions and assistance, as needed. - Encourage rooming-in and breast feeding on demand.  - Encourage skin-to-skin contact. - Provide LC support as needed. - Assess for and manage engorgement. - Provide breast feeding education handouts and information on community breast feeding support. Outcome: Progressing  Goal: Establishment of adequate milk supply with medication/procedure interruptions  Description: INTERVENTIONS:  - Review techniques for milk expression (breast pumping). - Provide pumping equipment/supplies, instructions, and assistance until it is safe to breastfeed infant. Outcome: Progressing  Goal: Experiences normal breast weaning course  Description: INTERVENTIONS:  - Assess for and manage engorgement. - Instruct on breast care. - Provide comfort measures. Outcome: Progressing  Goal: Appropriate maternal -  bonding  Description: INTERVENTIONS:  - Assess caregiver- interactions. - Assess caregiver's emotional status and coping mechanisms. - Encourage caregiver to participate in  daily care. - Assess support systems available to mother/family.  - Provide /case management support as needed.   Outcome: Progressing

## 2023-07-16 NOTE — LACTATION NOTE
LACTATION NOTE - MOTHER      Evaluation Type: Inpatient    Problems identified  Problems identified: Knowledge deficit  Problems Identified Other: 37+6 weeks gestation, BF with nipple shield due to latch difficulties    Maternal history  Maternal history: Caesarean section; Anemia  Other/comment: Breech    Breastfeeding goal  Breastfeeding goal: To maintain breast milk feeding per patient goal    Maternal Assessment  Bilateral Breasts: Symmetrical;Soft  Bilateral Nipples: Slightly everted/short  Prior breastfeeding experience (comment below): Primip  Breastfeeding Assistance: Breastfeeding assistance provided with permission    Pain assessment  Location/Comment: denies    Guidelines for use of:  Equipment: Nipple shield  Breast pump type: Ameda Platinum;Hand Pump  Current use of pump[de-identified] Encouraged pumping if nipple shield use persists, recommended latch/assist without nipple shield use  Suggested use of pump: Pump if infant is not latching to breast;Pump after nursing if a nipple shield is used  Other (comment):  Infant at breast when [facility] arrived with nipple shield in place, sleepy, responded well to stimulation and breast compressions

## 2023-07-16 NOTE — ANESTHESIA PREPROCEDURE EVALUATION
Anesthesia PreOp Note    HPI:     David Murry is a 32year old female who presents for preoperative consultation requested by: Keith Robin MD    Date of Surgery: 7/15/2023 - 2023    Procedure(s):   SECTION  Indication: JAMI //Breech//primary section    Relevant Problems   No relevant active problems       NPO:                         History Review:  Patient Active Problem List    Pregnancy         Date Noted: 07/15/2023      Breech presentation         Date Noted: 2023      Anemia during pregnancy in second trimester         Date Noted: 2023      Bicornuate uterus affecting pregnancy, antepartum         Date Noted: 2023      History of  delivery, currently pregnant         Date Noted: 2022      Enlarged lymph node in neck         Date Noted: 2019        Past Medical History:   Diagnosis Date    Anemia     History of pregnancy 2013        Papanicolaou smear of cervix with low grade squamous intraepithelial lesion (LGSIL) 2020       Past Surgical History:   Procedure Laterality Date      2013       Ferrous Sulfate (SLOW FE OR), Take by mouth., Disp: , Rfl: , 7/15/2023  prenatal vitamin with DHA 27-0.8-228 MG Oral Cap, Take 1 capsule by mouth daily. , Disp: , Rfl: , 7/15/2023      lactated ringers IV bolus 1,000 mL, 1,000 mL, Intravenous, Once, Keith Robin MD, Last Rate: 2,000 mL/hr at 07/15/23 2300, 1,000 mL at 07/15/23 2300   Followed by  lactated ringers infusion, 125 mL/hr, Intravenous, Continuous, Mireya Justin MD  acetaminophen (Tylenol Extra Strength) tab 1,000 mg, 1,000 mg, Oral, Once, Keith Robin MD  ondansetron (Zofran) 4 MG/2ML injection 4 mg, 4 mg, Intravenous, Q6H PRN, Keith Robin MD  sodium citrate-citric acid (Bicitra) 500-334 MG/5ML oral solution 30 mL, 30 mL, Oral, Once, Keith Robin MD  oxyTOCIN in sodium chloride 0.9% (Pitocin) 30 Units/500mL infusion premix, 62.5-900 kalia-units/min, Intravenous, Continuous, Sherita Camilo MD  famotidine (Pepcid) tab 20 mg, 20 mg, Oral, Once, Sherita Camilo MD   Or  famotidine (Pepcid) 20 mg/2mL injection 20 mg, 20 mg, Intravenous, Once, Sherita Camilo MD  metoclopramide (Reglan) tab 10 mg, 10 mg, Oral, Once, Sherita Camilo MD   Or  metoclopramide (Reglan) 5 mg/mL injection 10 mg, 10 mg, Intravenous, Once, Sherita Camilo MD  ceFAZolin (Ancef) 2 g in 20mL IV syringe premix, 2 g, Intravenous, Once, Sherita Camilo MD  azithromycin (Zithromax) 500 mg in sodium chloride 0.9% 250mL IVPB premix, 500 mg, Intravenous, 30 Min Pre-Op, Corby Justin MD    No current Epic-ordered outpatient medications on file. No Known Allergies    Family History   Problem Relation Age of Onset    Lipids Father         hyperlipidemia    Neurological Disorder Father         headaches    Other (CABG) Father     Colon Cancer Maternal Grandmother     Other (Other) Maternal Grandfather         CHF    Lipids Maternal Grandfather         hyperlipidemia    Hypertension Maternal Grandfather     Breast Cancer Paternal Grandmother     Heart Disease Paternal Grandfather      Social History    Socioeconomic History      Marital status: Single    Tobacco Use      Smoking status: Never      Smokeless tobacco: Never    Substance and Sexual Activity      Alcohol use: Yes        Comment: SOCIALLY. Stopped before becoming pregnant      Drug use: No        Comment: NONE      Sexual activity: Yes        Partners: Male        Birth control/protection: Condom    Other Topics      Concerns:        Caffeine Concern: Yes          soda occ      Available pre-op labs reviewed. Lab Results   Component Value Date    WBC 9.0 07/15/2023    RBC 4.22 07/15/2023    HGB 12.2 07/15/2023    HCT 37.9 07/15/2023    MCV 89.8 07/15/2023    MCH 28.9 07/15/2023    MCHC 32.2 07/15/2023    RDW 17.7 (H) 07/15/2023    .0 07/15/2023             Vital Signs:   There is no height or weight on file to calculate BMI.   vitals were not taken for this visit. There were no vitals filed for this visit. Anesthesia Evaluation      Airway   Mallampati: I  TM distance: >3 FB  Neck ROM: full  Dental      Pulmonary - normal exam   Cardiovascular - normal exam    Neuro/Psych      GI/Hepatic/Renal      Endo/Other    (+) blood dyscrasia  Abdominal   (+) obese                 Anesthesia Plan:   ASA:  2  Emergent    Plan:   Spinal  Post-op Pain Management: Intrathecal narcotics  Informed Consent Plan and Risks Discussed With:  Patient      I have informed Ying Last and/or legal guardian or family member of the nature of the anesthetic plan, benefits, risks including possible dental damage if relevant, major complications, and any alternative forms of anesthetic management. All of the patient's questions were answered to the best of my ability. The patient desires the anesthetic management as planned.   Angi Luz MD  7/15/2023 11:11 PM  Present on Admission:  **None**

## 2023-07-16 NOTE — ANESTHESIA PROCEDURE NOTES
Spinal Block    Date/Time: 7/15/2023 11:40 PM    Performed by: Anne Henderson MD  Authorized by: Anne Henderson MD      General Information and Staff    Start Time:  7/15/2023 11:40 PM  End Time:  7/15/2023 11:45 PM  Anesthesiologist:  Anne Henderson MD  Performed by:   Anesthesiologist  Preanesthetic Checklist: patient identified, IV checked, risks and benefits discussed, monitors and equipment checked, pre-op evaluation, timeout performed, anesthesia consent and sterile technique used      Procedure Details    Patient Position:  Sitting  Prep: ChloraPrep    Monitoring:  Cardiac monitor  Approach:  Midline  Location:  L3-4  Injection Technique:  Single-shot    Needle    Needle Type:  Pencil-tip  Needle Gauge:  24 G  Needle Length:  3.5 in    Assessment    Sensory Level:  T10  Events: clear CSF, CSF aspirated, well tolerated and blood negative      Additional Comments

## 2023-07-16 NOTE — H&P
[address] Patient Status:  Surgery Admit - Inpt    1995 MRN [id number]   Location [address] Attending Socorro Burns MD   Hosp Day # 0 PCP Jared MENDEZ [id number] [facility],      Date of Admission:  7/15/2023      HPI:   Ying Last is a 32year old  female, current EGA of 37w5d with an estimated date of delivery of: 2023, by Ultrasound who presents due to ROM since 5 pm -- did not call until 840 pm. Presented to [facility] at 1030 pm. Known breech. Being admitted for . PNC notable for:  Problem List: Patient Active Problem List    Pregnancy      Breech presentation            Bicornuate uterus - contraindication to ECV      Anemia during pregnancy in second trimester            Recent Labs               22            0937 23            0817 23            1211             RBC 4.58 4.24 3.70*             HGB 13.4 12.5 10.4*             HCT 41.3 37.8 33.9*             MCV 90.2 89.2 91.6             MCH 29.3 29.5 28.1             MCHC 32.4 33.1 30.7*             RDW 13.4 13.3 15.8*             NEPRELIM 6.93 10.24*  --              WBC 9.6 12.4* 9.7             .0 347.0 287.0                                                             If Hb less than 11, start iron & then recheck            CBC/ferritin in one month                        Decreased MCV, try iron x one month, repeat CBC. If not improved significantly, then iron studies            (ferritin, TIBC), Hb electorphoresis                        Normal MCV: Hb electrophoresis                        Increased MCV: check for vit B12, folate                        If Hb<8, MFM & heme consults and consider            transfusion                               Bicornuate uterus affecting pregnancy, antepartum            23: EFW 2462 g ( 5 lb 7 oz); 52%. 23: EFW 1244 g ( 2 lb 12 oz); 58%. 23  Normal level 2 and bicornuate uterus-             Fetus in right horn. Septum extends about 2/3 of the cavity            Growth at 28 and 34 weeks                  History of  delivery, currently pregnant            23  Normal level 2 and bicornuate uterus-             Fetus in right horn. Septum extends about 2/3 of the cavity                        Normal level 2 US 3/14/2023            Delivery at 36w6d      Enlarged lymph node in neck      Fetal Movement reported as good. GBS and RH reviewed. Lab Results   Component Value Date    ABO BLOOD TYPE A 2022    RH BLOOD TYPE Positive 2022    HGB 10.9 (L) 2023    .0 2023    HCT 34.5 (L) 2023    Rubella IgG Positive 2022    Treponemal Antibodies Negative 2023    HIV Antigen Antibody Combo Non-Reactive 2023    Group B PCR Negative 2023         History   Obstetric History:   OB History    Para Term  AB Living   2 1 0 1   1   SAB IAB Ectopic Multiple Live Births           1      # Outcome Date GA Lbr Estevan/2nd Weight Sex Delivery Anes PTL Lv   2 Current            1  13 36w6d  6 lb 10 oz (3.005 kg) M NORMAL SPONT  N ROMARIO      Birth Comments: Radha LARKIN       Gyne History: Cervical Papanicolaou to be done in MD's office  , monthly periods, hx of STD: none    Past Medical History:   Past Medical History:   Diagnosis Date    Anemia     History of pregnancy 2013    [facility]    Papanicolaou smear of cervix with low grade squamous intraepithelial lesion (LGSIL) 2020       Past Surgerical History:   Past Surgical History:   Procedure Laterality Date      2013       Social History: Social History    Tobacco Use      Smoking status: Never      Smokeless tobacco: Never    Alcohol use: Yes      Comment: SOCIALLY. Stopped before becoming pregnant       Allergies/Medications:    Allergies:   No Known Allergies    Medications:  Ferrous Sulfate (SLOW FE OR), Take by mouth., Disp: , Rfl: , 7/15/2023  prenatal vitamin with DHA 27-0.8-228 MG Oral Cap, Take 1 capsule by mouth daily. , Disp: , Rfl: , 7/15/2023          Review of Systems:   As documented in HPI      Physical Exam:        Constitutional: alert and cooperative in No distress    Abdomen: gravid nontender, EFW 7 lbs, breech -- verified by bedside u/s    Vaginal exam: defer    No data found. FHT assessment:   Baseline: 135 bpm   Variability: moderate   Accels:  present   Decels: No   Tocos:  No   Pitocen: none   Category: 1 tracing    Results:     Lab Results   Component Value Date    TREPONEMALAB Negative 2023    HBVSAG Nonreactive 2017    ABO A 2022    RH Positive 2022    WBC 7.8 2023    HGB 10.9 (L) 2023    HCT 34.5 (L) 2023    .0 2023    CREATSERUM 0.75 2023    BUN 11 2023     (L) 2023    K 3.7 2023     2023    CO2 24.0 2023     (H) 2023    CA 9.1 2023    ALB 3.5 2023    ALKPHO 47 2023    BILT 0.6 2023    TP 7.8 2023    AST 11 (L) 2023    ALT 19 2023    TSH 1.220 2020       Lab Results   Component Value Date    COLORUR Yellow 2020    CLARITY Cloudy (A) 2020    SPECGRAVITY 1.025 2023    PROUR 30 (A) 2020    GLUUR Negative 2020    KETUR Negative 2020    BILUR Negative 2020    BLOODURINE Negative 2020    NITRITE Negative 2023    UROBILINOGEN <2.0 2020    LEUUR Moderate (A) 2020          No results found. Assessment/Plan:     IUP 37w5d in / with SROM x 6 hours,  known breech position    Obstetrical history significant for as listed.  Patient Active Problem List:     Enlarged lymph node in neck     History of  delivery, currently pregnant     Bicornuate uterus affecting pregnancy, antepartum     Anemia during pregnancy in second trimester     Breech presentation     Pregnancy      Treatment Plan: For immediate CSx      Risks, benefits, alternatives and possible complications have been discussed in detail with the patient. Pre-admission, admission, and post admission procedures and expectations were discussed in detail.     All questions answered; all appropriate consents will be signed at the Toribio Oseguera MD  7/15/2023  11:02 PM

## 2023-07-17 LAB
BASOPHILS # BLD AUTO: 0.02 X10(3) UL (ref 0–0.2)
BASOPHILS NFR BLD AUTO: 0.2 %
DEPRECATED RDW RBC AUTO: 60.9 FL (ref 35.1–46.3)
EOSINOPHIL # BLD AUTO: 0.07 X10(3) UL (ref 0–0.7)
EOSINOPHIL NFR BLD AUTO: 0.7 %
ERYTHROCYTE [DISTWIDTH] IN BLOOD BY AUTOMATED COUNT: 18 % (ref 11–15)
HCT VFR BLD AUTO: 28.1 %
HGB BLD-MCNC: 8.9 G/DL
IMM GRANULOCYTES # BLD AUTO: 0.06 X10(3) UL (ref 0–1)
IMM GRANULOCYTES NFR BLD: 0.6 %
LYMPHOCYTES # BLD AUTO: 1.27 X10(3) UL (ref 1–4)
LYMPHOCYTES NFR BLD AUTO: 12.5 %
MCH RBC QN AUTO: 29.4 PG (ref 26–34)
MCHC RBC AUTO-ENTMCNC: 31.7 G/DL (ref 31–37)
MCV RBC AUTO: 92.7 FL
MONOCYTES # BLD AUTO: 0.69 X10(3) UL (ref 0.1–1)
MONOCYTES NFR BLD AUTO: 6.8 %
NEUTROPHILS # BLD AUTO: 8.02 X10 (3) UL (ref 1.5–7.7)
NEUTROPHILS # BLD AUTO: 8.02 X10(3) UL (ref 1.5–7.7)
NEUTROPHILS NFR BLD AUTO: 79.2 %
PLATELET # BLD AUTO: 209 10(3)UL (ref 150–450)
RBC # BLD AUTO: 3.03 X10(6)UL
WBC # BLD AUTO: 10.1 X10(3) UL (ref 4–11)

## 2023-07-17 NOTE — PROGRESS NOTES
[facility]OSP - [facility]   Acute Pain Rounds Note  2023    Patient name: Dileep Li 32year old female  : 1995  MRN: [id number]    Diagnosis: [unfilled]    S/P: c section    Pain modality: Duramorph    Current hospital day: Hospital Day: 3    Pain Scores: 2    Current Medications:  Scheduled Meds:   acetaminophen  1,000 mg Oral Q6H    ibuprofen  600 mg Oral Q6H    docusate sodium  100 mg Oral [email or web address]     Continuous Infusions:  PRN Meds:.nalbuphine, gabapentin, witch hazel-glycerin, phenylephrine-min oil-robi, simethicone, magnesium hydroxide, bisacodyl, ondansetron, ammonia aromatic    Assessment:  Doing well    Plan:  Po meds    Johnny Moran MD  Anesthesia Acute Pain Service [phone]

## 2023-07-17 NOTE — LACTATION NOTE
This note was copied from a baby's chart.     23 1230   Evaluation Type   Evaluation Type Inpatient   Problems & Assessment   Problems Diagnosed or Identified 37-38 weeks gestation; Latch difficulty;  feeding problem; Tongue restriction   Problems: comment/detail anterior lingual frenulum   Infant Assessment Hunger cues present   Muscle tone Appropriate for GA   Feeding Assessment   Summary Current Feeding Breastfeeding with breast milk supplement   Breastfeeding Assessment Calm and ready to breastfeed;Sustained nutritive latch using nipple shield;Deep latch achieved and observed   Breastfeeding lasted # of minutes 15  (still  feeding when LC left room)   Breastfeeding Positions football;left breast   Latch 2   Audible Sucks/Swallows 2   Type of Nipple 2   Comfort (Breast/Nipple) 2   Hold (Positioning) 1   LATCH Score 9   Other (comment) Latch attempted uncuccessgully without NS in FB position. Infant latched eagerly and sucked nutritively with minimal stimulation after introduction of NS. Instructed MOB on breast massage, nutritive suck, infant stimualtion , signs of satiety. Enc follow up with lactation post discharge.

## 2023-07-17 NOTE — PLAN OF CARE
Problem: PAIN - ADULT  Goal: Verbalizes/displays adequate comfort level or patient's stated pain goal  Description: INTERVENTIONS:  - Encourage pt to monitor pain and request assistance  - Assess pain using appropriate pain scale  - Administer analgesics based on type and severity of pain and evaluate response  - Implement non-pharmacological measures as appropriate and evaluate response  - Consider cultural and social influences on pain and pain management  - Manage/alleviate anxiety  - Utilize distraction and/or relaxation techniques  - Monitor for opioid side effects  - Notify MD/LIP if interventions unsuccessful or patient reports new pain  - Anticipate increased pain with activity and pre-medicate as appropriate  Outcome: Progressing     Problem: SKIN/TISSUE INTEGRITY - ADULT  Goal: Skin integrity remains intact  Description: INTERVENTIONS  - Assess and document risk factors for pressure ulcer development  - Assess and document skin integrity  - Monitor for areas of redness and/or skin breakdown  - Initiate interventions, skin care algorithm/standards of care as needed  Outcome: Progressing  Goal: Incision(s), wounds(s) or drain site(s) healing without S/S of infection  Description: INTERVENTIONS:  - Assess and document risk factors for pressure ulcer development  - Assess and document skin integrity  - Assess and document dressing/incision, wound bed, drain sites and surrounding tissue  - Implement wound care per orders  - Initiate isolation precautions as appropriate  - Initiate Pressure Ulcer prevention bundle as indicated  Outcome: Progressing     Problem: POSTPARTUM  Goal: Long Term Goal:Experiences normal postpartum course  Description: INTERVENTIONS:  - Assess and monitor vital signs and lab values. - Assess fundus and lochia. - Provide ice/sitz baths for perineum discomfort. - Monitor healing of incision/episiotomy/laceration, and assess for signs and symptoms of infection and hematoma.   - Assess bladder function and monitor for bladder distention.  - Provide/instruct/assist with pericare as needed. - Provide VTE prophylaxis as needed. - Monitor bowel function.  - Encourage ambulation and provide assistance as needed. - Assess and monitor emotional status and provide social service/psych resources as needed. - Utilize standard precautions and use personal protective equipment as indicated. Ensure aseptic care of all intravenous lines and invasive tubes/drains.  - Obtain immunization and exposure to communicable diseases history. 7/16/2023 2252 by Haydee Houston RN  Outcome: Progressing  7/16/2023 2252 by Haydee Houston RN  Outcome: Progressing  Goal: Optimize infant feeding at the breast  Description: INTERVENTIONS:  - Initiate breast feeding within first hour after birth. - Monitor effectiveness of current breast feeding efforts. - Assess support systems available to mother/family.  - Identify cultural beliefs/practices regarding lactation, letdown techniques, maternal food preferences. - Assess mother's knowledge and previous experience with breast feeding.  - Provide information as needed about early infant feeding cues (e.g., rooting, lip smacking, sucking fingers/hand) versus late cue of crying.  - Discuss/demonstrate breast feeding aids (e.g., infant sling, nursing footstool/pillows, and breast pumps). - Encourage mother/other family members to express feelings/concerns, and actively listen. - Educate father/SO about benefits of breast feeding and how to manage common lactation challenges. - Recommend avoidance of specific medications or substances incompatible with breast feeding.  - Assess and monitor for signs of nipple pain/trauma. - Instruct and provide assistance with proper latch. - Review techniques for milk expression (breast pumping) and storage of breast milk. Provide pumping equipment/supplies, instructions and assistance, as needed.   - Encourage rooming-in and breast feeding on demand.  - Encourage skin-to-skin contact. - Provide LC support as needed. - Assess for and manage engorgement. - Provide breast feeding education handouts and information on community breast feeding support. 2023 by Kolton Johnson RN  Outcome: Progressing  2023 by Kolton Johnson RN  Outcome: Progressing  Goal: Establishment of adequate milk supply with medication/procedure interruptions  Description: INTERVENTIONS:  - Review techniques for milk expression (breast pumping). - Provide pumping equipment/supplies, instructions, and assistance until it is safe to breastfeed infant. 2023 by Kolton Johnson RN  Outcome: Progressing  2023 by Kolton Johnson RN  Outcome: Progressing  Goal: Experiences normal breast weaning course  Description: INTERVENTIONS:  - Assess for and manage engorgement. - Instruct on breast care. - Provide comfort measures. 2023 by Kolton Johnson RN  Outcome: Progressing  2023 by Kolton Johnson RN  Outcome: Progressing  Goal: Appropriate maternal -  bonding  Description: INTERVENTIONS:  - Assess caregiver- interactions. - Assess caregiver's emotional status and coping mechanisms. - Encourage caregiver to participate in  daily care. - Assess support systems available to mother/family.  - Provide /case management support as needed.   Outcome: Progressing     Problem: GASTROINTESTINAL - ADULT  Goal: Maintains or returns to baseline bowel function  Description: INTERVENTIONS:  - Assess bowel function  - Maintain adequate hydration with IV or PO as ordered and tolerated  - Evaluate effectiveness of GI medications  - Encourage mobilization and activity  - Obtain nutritional consult as needed  - Establish a toileting routine/schedule  - Consider collaborating with pharmacy to review patient's medication profile  2023 by Kolton Johnson RN  Outcome: Progressing  2023 by Willian Rios Harsh Shaw RN  Outcome: Progressing

## 2023-07-18 VITALS
RESPIRATION RATE: 18 BRPM | HEART RATE: 88 BPM | TEMPERATURE: 98 F | SYSTOLIC BLOOD PRESSURE: 124 MMHG | OXYGEN SATURATION: 96 % | DIASTOLIC BLOOD PRESSURE: 75 MMHG

## 2023-07-18 PROBLEM — O92.79 POOR LATCH ON, POSTPARTUM (HCC): Status: ACTIVE | Noted: 2023-07-18

## 2023-07-18 PROBLEM — O92.79 POOR LATCH ON, POSTPARTUM: Status: ACTIVE | Noted: 2023-07-18

## 2023-07-18 PROBLEM — Z91.89 AT RISK FOR INEFFECTIVE BREASTFEEDING: Status: ACTIVE | Noted: 2023-07-18

## 2023-07-18 RX ORDER — PSEUDOEPHEDRINE HCL 30 MG
100 TABLET ORAL 2 TIMES DAILY PRN
Qty: 30 CAPSULE | Refills: 1 | Status: SHIPPED | OUTPATIENT
Start: 2023-07-18

## 2023-07-18 RX ORDER — IBUPROFEN 600 MG/1
600 TABLET ORAL EVERY 6 HOURS PRN
Qty: 30 TABLET | Refills: 0 | Status: SHIPPED | OUTPATIENT
Start: 2023-07-18

## 2023-07-18 NOTE — PROGRESS NOTES
Patient noted to not have IV access. MD notified and recommended that the patient have new IV placed and x1 additional dose of IV iron prior to discharge due to hgb <9. Patient informed of MD recommendation. Patient refusing IV and IV iron at this time.

## 2023-07-18 NOTE — LACTATION NOTE
LACTATION NOTE - MOTHER    Discharge lactation consultation - opts to pump and bottle feed exclsively. Evaluation Type: Inpatient    Problems identified  Problems identified: Knowledge deficit; Nipple pain; Unable to acheive sustained latch; Recent antibiotic use  Problems Identified Other: oral restriction; 37+6 weeks gestation, short nipples, struggled to latch with and w/o nipple shield - baby at 8.6% weight loss - jaundiced -    Maternal history  Maternal history: Caesarean section  Other/comment: Breech    Breastfeeding goal  Breastfeeding goal: To maintain breast milk feeding per patient goal    Maternal Assessment  Bilateral Breasts: Symmetrical;Dense  Bilateral Nipples: Slightly everted/short; Other (comment);Pink;Sore (small 'button nipples')  Prior breastfeeding experience (comment below): Multip  Prior BF experience: comment: none - bottle fed first child 10 years ago  Breastfeeding Assistance: Breastfeeding assistance provided with permission    Pain assessment  Pain, additional: Pain location  Pain Location: Nipples  Location/Comment: sore nipples  Treatment of Sore Nipples: Expressed breast milk; Lanolin    Guidelines for use of:  Equipment: Lanolin; Nipple shield (has opted to strictly pump and bottle feed baby - will not be latching with a nipple shield)  Breast pump type: [employer]a Platinum; Other (needs to purchase a personal use double electric breast pump - initiated pumping with [employer] Platinum- rented a AMEDA Platinum for one week - will pursue obtaining a double electric breast pump through insurance. References provided.)  Current use of pump[de-identified] Has opted to exclusivley pump and bottle feed baby, states she is comfortable with that plan and was initially latching to \"see how it goes\". Infant with oral restriction, maternal short nipples and latch difficulty  Suggested use of pump: Pump 8-12X/24hr;For comfort as needed; Avoid overstimulation of milk supply  Reported pumping volumes (ml): 3 ml - still pumping  Other (comment): Infant with oral restriction and latch difficulty, latching attempted with and w/o a nipple shield at an 8.6 % weight loss, sleepy and jaudiced. Mother as opted to exclusivley pump and bottle feed baby, states she is comfortable with that plan and was initially latching to TAMPERE how it goes\". Needs to purchase a personal use double electric breast pump - initiated pumping with [employer] Platinum- rented a AMEDA Platinum for one week - will pursue obtaining a double electric breast pump through insurance, references provided. Flange sizing and information  provided, measures for a 21 mm flange, inserts provided. Initiated use of the [employer]EDA Platinum breast pump, encouraged manual massage and expression of breast milk.

## 2023-07-18 NOTE — PLAN OF CARE
Problem: PAIN - ADULT  Goal: Verbalizes/displays adequate comfort level or patient's stated pain goal  Description: INTERVENTIONS:  - Encourage pt to monitor pain and request assistance  - Assess pain using appropriate pain scale  - Administer analgesics based on type and severity of pain and evaluate response  - Implement non-pharmacological measures as appropriate and evaluate response  - Consider cultural and social influences on pain and pain management  - Manage/alleviate anxiety  - Utilize distraction and/or relaxation techniques  - Monitor for opioid side effects  - Notify MD/LIP if interventions unsuccessful or patient reports new pain  - Anticipate increased pain with activity and pre-medicate as appropriate  Outcome: Progressing     Problem: POSTPARTUM  Goal: Long Term Goal:Experiences normal postpartum course  Description: INTERVENTIONS:  - Assess and monitor vital signs and lab values. - Assess fundus and lochia. - Provide ice/sitz baths for perineum discomfort. - Monitor healing of incision/episiotomy/laceration, and assess for signs and symptoms of infection and hematoma. - Assess bladder function and monitor for bladder distention.  - Provide/instruct/assist with pericare as needed. - Provide VTE prophylaxis as needed. - Monitor bowel function.  - Encourage ambulation and provide assistance as needed. - Assess and monitor emotional status and provide social service/psych resources as needed. - Utilize standard precautions and use personal protective equipment as indicated. Ensure aseptic care of all intravenous lines and invasive tubes/drains.  - Obtain immunization and exposure to communicable diseases history. Outcome: Progressing  Goal: Optimize infant feeding at the breast  Description: INTERVENTIONS:  - Initiate breast feeding within first hour after birth. - Monitor effectiveness of current breast feeding efforts.   - Assess support systems available to mother/family.  - Identify cultural beliefs/practices regarding lactation, letdown techniques, maternal food preferences. - Assess mother's knowledge and previous experience with breast feeding.  - Provide information as needed about early infant feeding cues (e.g., rooting, lip smacking, sucking fingers/hand) versus late cue of crying.  - Discuss/demonstrate breast feeding aids (e.g., infant sling, nursing footstool/pillows, and breast pumps). - Encourage mother/other family members to express feelings/concerns, and actively listen. - Educate father/SO about benefits of breast feeding and how to manage common lactation challenges. - Recommend avoidance of specific medications or substances incompatible with breast feeding.  - Assess and monitor for signs of nipple pain/trauma. - Instruct and provide assistance with proper latch. - Review techniques for milk expression (breast pumping) and storage of breast milk. Provide pumping equipment/supplies, instructions and assistance, as needed. - Encourage rooming-in and breast feeding on demand.  - Encourage skin-to-skin contact. - Provide LC support as needed. - Assess for and manage engorgement. - Provide breast feeding education handouts and information on community breast feeding support. Outcome: Progressing  Goal: Establishment of adequate milk supply with medication/procedure interruptions  Description: INTERVENTIONS:  - Review techniques for milk expression (breast pumping). - Provide pumping equipment/supplies, instructions, and assistance until it is safe to breastfeed infant. Outcome: Progressing  Goal: Experiences normal breast weaning course  Description: INTERVENTIONS:  - Assess for and manage engorgement. - Instruct on breast care. - Provide comfort measures. Outcome: Progressing  Goal: Appropriate maternal -  bonding  Description: INTERVENTIONS:  - Assess caregiver- interactions. - Assess caregiver's emotional status and coping mechanisms.   - Encourage caregiver to participate in  daily care. - Assess support systems available to mother/family.  - Provide /case management support as needed.   Outcome: Progressing     Problem: GASTROINTESTINAL - ADULT  Goal: Maintains or returns to baseline bowel function  Description: INTERVENTIONS:  - Assess bowel function  - Maintain adequate hydration with IV or PO as ordered and tolerated  - Evaluate effectiveness of GI medications  - Encourage mobilization and activity  - Obtain nutritional consult as needed  - Establish a toileting routine/schedule  - Consider collaborating with pharmacy to review patient's medication profile  Outcome: Progressing

## 2023-07-18 NOTE — LACTATION NOTE
This note was copied from a baby's chart. Ad[facility] Snowden LACTATION NOTE - INFANT    Discharge lactation consultation - mother opts to exclusively pump and bottle feed baby. Evaluation Type  Evaluation Type: Inpatient    Problems & Assessment  Problems Diagnosed or Identified: 37-38 weeks gestation; Latch difficulty;  feeding problem; Tongue restriction;Jaundice;Sleepy (8.6% weight loss)  Degree of Jaundice: To abdomen  Problems: comment/detail: anterior lingual frenulum  Infant Assessment: Hunger cues present;Skin color: jaundice  Muscle tone: Appropriate for GA    Feeding Assessment  Summary Current Feeding: Breastfeeding with formula supplement;Breastfeeding with breast milk supplement  Last 24 hour feeding summary: see I and O  Breastfeeding Assessment: Assisted with breastfeeding w/mother's permission; No sustained latch to breast;Nipple shield initiated with non-nutritive suckling (shallow latch with nipple shield- unable to sustain a latch w/o nipple shield)  Breastfeeding lasted # of minutes: 0 (attempted to latch- not sustaind with or w/o a nipple shield)  Breastfeeding Positions: football  Latch: Too sleepy or reluctant, no latch achieved  Audible Sucks/Swallows: None  Type of Nipple: Flat (short small nipples)  Comfort (Breast/Nipple): Soft/non-tender (dense breasts)  Hold (Positioning): Full assist, staff holds infant at breast  LATCH Score: 3  Other (comment): Infant with oral restriction and latch difficulty, latching attempted with and w/o a nipple shield at an 8.6 % weight loss, sleepy and jaudiced. Mother as opted to exclusivley pump and bottle feed baby, states she is comfortable with that plan and was initially latching to TAMPERE how it goes\". Needs to purchase a personal use double electric breast pump - initiated pumping with [employer]atinum- rented a AMEDA Platinum for one week - will pursue obtaining a double electric breast pump through insurance, references provided.   Flange sizing and information provided, measures for a 21 mm flange, inserts provided. Initiated use of the [employer] Platinum breast pump, encouraged manual massage and expression of breast milk. Reviewed paced bottle feeding and appropriate feeding amounts.

## 2023-08-24 ENCOUNTER — TELEPHONE (OUTPATIENT)
Dept: OBGYN UNIT | Facility: HOSPITAL | Age: 28
End: 2023-08-24

## 2023-08-24 NOTE — PROGRESS NOTES
MEDICARE WELLNESS VISIT NOTE      History of Present Illness:   Monalisa Springer presents for her    Subsequent Annual Medicare Wellness Visit.     She has complaints or concerns which include none.        Patient Care Team:  Tara Butler MD as PCP - General (Family Practice)  Griffin Brantley MD (Orthopedic Surgery)  Griffin Brantley MD (Orthopedic Surgery)  Bernard Kruger MD as Dermatology (Dermatology)  Diony Diaz MD as General Surgeon (General Surgery)     Past and active medical, family ,social and surgical history as well as medications and allergies reviewed by nursing staff and their notes reviewed and agreed with and updated in the EHR      The following items on the Medicare Health Risk Assessment were found to be positive  6 a.) How many servings of Fruits and Vegetables do you have each day ( 1 serving = 1 piece of fruit, 1/2 cup fruits or vegetables): 1 per day     6 b.) How many servings of High Fiber / Whole Grain Foods to you have each day ( 1 serving = 1 cup cold cereal, 1/2 cup cooked cereal, 1 slice bread): 1 per day     7c.) Do you worry about falling?: Yes         Vision and hearing screens: Not performed    Advance Directive:   The patient has the following documents:  On file   Power of  for Health Care  Living Will (Declaration for Physicians)    Cognitive Assessment: no evidence of cognitive dysfunction by direct observation    Recent PHQ 2/9 Score    PHQ 2:  Date Adult PHQ 2 Score Adult PHQ 2 Interpretation   12/20/2021 0 No further screening needed       PHQ 9:  Date Adult PHQ 9 Score Adult PHQ 9 Interpretation   12/20/2021 1 Minimal Depression        Have you had the following symptoms for at least 2 weeks    1. Markedly diminished interest or pleasure in all, or almost all, activities most of the day, nearly every day (as indicated by either subjective account or observation made by others) 0    2. Depressed mood most of the day, nearly every day, as indicated by either  [facility] call letter sent via [address] [place] [address]. subjective report (e.g., feels sad or empty) or observation made by others (e.g., appears tearful). * 1    3. Insomnia or hypersomnia nearly every day 1    4. Fatigue or loss of energy nearly every day 1    5. Significant weight loss when not dieting or weight gain (e.g., a change of more than 5% of body weight in a month), or decrease or increase in appetite nearly every day. 1    6. Feelings of worthlessness or excessive or inappropriate guilt (which may be delusional) nearly every day (not merely self-reproach or guilt about being sick) 1    7. Diminished ability to think or concentrate, or indecisiveness, nearly every day (either by subjective account or as observed by others) 0    8. Psychomotor agitation or retardation nearly every day (observable by others, not merely subjective feelings of restlessness or being slowed down) 0    9. Recurrent thoughts of death (not just fear of dying), recurrent suicidal ideation without a specific plan, or a suicide attempt or a specific plan for committing suicide 0    A major depressive syndrome or episode manifests with five or more of the following symptoms, present most of the day nearly every day for a minimum of two consecutive weeks. At least one symptom is either depressed mood or loss of interest or pleasure.    DEPRESSION ASSESSMENT/PLAN:  Depression Screening performed. Screening time with patient was 9 minutes.     Body mass index is 40.12 kg/m².    BMI ASSESSMENT/PLAN:  Patient is obese.    Follow-up in 3 m           ------------------------------------------------------------------------    Please refer to hpi for pertinent negative and positive review of systems.    Review of Systems   Constitutional: Negative for appetite change and unexpected weight change.   HENT: Negative for congestion, ear discharge, ear pain, hearing loss, rhinorrhea, sinus pressure, sinus pain, sore throat, tinnitus and trouble swallowing.    Eyes: Negative for pain and visual  disturbance.   Respiratory: Negative for chest tightness and shortness of breath.    Cardiovascular: Negative for chest pain, palpitations and leg swelling.   Gastrointestinal: Negative for abdominal pain, constipation, diarrhea, nausea and vomiting.   Endocrine: Positive for heat intolerance (anxiety). Negative for cold intolerance.   Genitourinary: Negative for enuresis, frequency, pelvic pain, urgency and vaginal discharge.   Musculoskeletal: Positive for arthralgias (hands only). Negative for joint swelling.   Skin: Negative for color change and rash.   Allergic/Immunologic: Negative for environmental allergies and food allergies.   Neurological: Negative for syncope, light-headedness and headaches.   Hematological: Does not bruise/bleed easily.   Psychiatric/Behavioral: Positive for sleep disturbance (puppy rt). Negative for dysphoric mood. The patient is nervous/anxious.         PHYSICAL EXAM:    Visit Vitals  /78   Pulse 94   Ht 5' 3.39\" (1.61 m)   Wt 104 kg (229 lb 4.5 oz)   SpO2 95%   BMI 40.12 kg/m²      General Appearance:  Alert oriented ×3 no acute distress pleasant and well-groomed, conversive  Head:  Normocephalic, without obvious abnormality, atraumatic.  Eyes:  extraocular movements intact, conjunctiva without erythema, sclera nonicteric nonerythematous  Ears:  Tympanic membranes pearly gray bilaterally without fluid, external canal without erythema or edema  Mouth:  Deferred due to Covid   Nose: Deferred due to Covid    Neck:  Supple, symmetrical, trachea midline, Anterior and posterior Cervical, supraclavicular, and axillary nodes without any lymphadenopathy    Thyroid has no enlargement/tenderness/nodules; no carotid bruit or jugular venous distention.  Lungs:  Clear to auscultation bilaterally, no wheezes rales or rhonchi noted, symmetric expansion bilaterally, respirations unlabored.  Chest Wall:  No tenderness or deformity.  CV:  Regular rate and rhythm, ??? without a murmur noted,  positive S1 and S2 ,no carotid bruits noted  Pulses:  2+ and symmetric all extremities.  Abdomen:  Soft, non-tender, nondistended, positive bowel sounds throughout, no hepatosplenomegaly noted, no masses noted, no hernias  Extremities:  Normal bulk and tone of muscles bilateral upper and lower extremities, no cyanosis or edema noted in bilateral lower extremities.  Neurologic:  Cranial nerves II-XII intact bilaterally grossly,  Skin:  Skin color, texture, turgor without abnormalities, no rashes or lesions noted. Skin check done and no significant abnormal moles noted  Lymph Nodes:  Anterior and posterior Cervical, supraclavicular, and axillary nodes without any lymphadenopathy  Breast Exam: RIGHT BREAST/AXILLA:  The tissue of the breast is soft, lobular.  There are no palpable masses.  There is no erythema, induration, thickening, retraction or peau d’orange changes of the skin.  The nipple is everted without discharge or skin changes.  There is no axillary adenopathy.  LEFT BREAST/AXILLA:  The tissue of the breast is soft, lobular. There are no palpable masses.  There is no erythema, induration, thickening, retraction or peau d’orange changes of the skin.  The nipple is everted without discharge or skin changes. There is no axillary adenopathy.  Pelvic:  External genitalia, bulbourethral and [place]'s glands without lesions or ulcerations.   non-well rugated mildly atrophic tissue noted without any lichen sclerosus or severe atrophy noted       LAB RESULTS:    Previous  And  current Lab results reviewed and can be found in the EHR     ASSESSMENT and PLAN:   Patient is a pleasant 79 year old female presenting with   Chief Complaint   Patient presents with   • Medicare Wellness Visit     Subsequent        annual Medicare wellness    Preventative health maintenance up-to-date    Aspirin needed is based on  [place] risk score and ASCVD risk score            Need                               https://tools.acc.org/qdpzk-jbxa-nxtwmmokv-plus/#!/calculate/estimate/  https://www.mdcalc.com/uanvtfuzow-ohly-mnrgn-hard-coronary-heart-disease#evidence   (more than 20% =asprin, more than 10% statin)  if [place] <7.5% marcial q4 yrs, if >7.5% calc q1-2yr)   Over 79? Then discussion,   on medications already? Then optimize no need keep calculating        Family history UTD    Colonoscopy performed in  2004     performed by                  which was negative/family history/colon polyps due again in   Aged out     Bone density due at 63   last performed     Never done prescription given 2022 ,   repeat due ???      FRAX   overall score                hips score                  Mammogram q.1-2 years >40 years old UP-TO-DATE, last 18  Overdue willing to get , prescription given     No longer needs pap aged out        Tdap   11/18     HPV    N/a    Menactra   N/a   Twinrix    NOT APPLICABLE     Pneumovax  #23  2005         #13   2016        Shingrix   #1       #2      UTD  2020    Flu shot     annually in the fall   last performed   2021           COVID  2021 and booster    Advance directive on file:  On file     Dentist Up to date   Ophthalmology Up to date     Exercise      3 X/wk currently 30 min chair exercises and balance  for the past 1 years, goals discussed 150 active min/week vs 3 times per week 60 minutes for 5 times per week 30 minutes , making commitment NEED to continue      Calcium intake  Milk 0x/week  Cheese (not velveta/kraft singles) 3x/week  Yogurt 3x/week  Green leafy veggies (salad spinach broccoli brussels sprouts) 2 x/week     Supplement   MVI  Yes                       Calcium (not plain vitamin d)  No    Lab work  FLP abnormal performed in   not applicable             FBG  abnormal  performed in    not applicable           TSH  abnormal  performed in   not applicable              CBC  normal  performed in      2021         Vitamin D abnormal performed in      not applicable       Hepatitis C  (born between 7300-9379)  not applicable performed in    not applicable     Measles vaccination/titer (born between 62 and 66 need titer potential received killed vaccine) not applicable performed in    not applicable      The patient wears seatbelts: Yes  Sunscreen: Yes, or avoids  Smoking history see history : Years    quit Yes     Alcohol history zero:per day/week/month  Persistent recent lower abdominal pain or bloating  No  Persistent decrease in appetite/early satiety  No  Unexpected weight loss No  Vaginal dryness, vaginal irritation, vaginal itching No  Urinary incontinence Yes, only if wait too long to go  Concerns for your safety or not feeling safe at home, someone harming you Yes    Grab bar at home and shower  Yes  Railings on stairs Yes  Throw rugs  No    Smoke alarm Yes   Shower/tub non slip device Yes   TUG sit edge of chair, hands on arm of chair, 10 ft away tape \"go\" time till sit back down, normal pace (goal <12 seconds) do sec will do 2023    See orders.  See separate documented note for other health issues discussed today  See Patient Instructions section.     The patient indicated and verbalized understanding of the diagnosis and agreed with the plan of care. Will return to clinic for any new or worsening symptoms.     Return in about 4 months (around 5/13/2022) for fast lab/chol.

## 2023-08-29 ENCOUNTER — POSTPARTUM (OUTPATIENT)
Dept: OBGYN CLINIC | Facility: CLINIC | Age: 28
End: 2023-08-29

## 2023-08-29 VITALS — WEIGHT: 161.38 LBS | BODY MASS INDEX: 31 KG/M2

## 2023-08-29 DIAGNOSIS — R59.0 ENLARGED LYMPH NODE IN NECK: ICD-10-CM

## 2023-08-29 PROBLEM — O99.012 ANEMIA DURING PREGNANCY IN SECOND TRIMESTER: Status: RESOLVED | Noted: 2023-05-04 | Resolved: 2023-08-29

## 2023-08-29 PROBLEM — Z34.90 PREGNANCY (HCC): Status: RESOLVED | Noted: 2023-07-15 | Resolved: 2023-08-29

## 2023-08-29 PROBLEM — O34.00 BICORNUATE UTERUS AFFECTING PREGNANCY, ANTEPARTUM: Status: RESOLVED | Noted: 2023-03-23 | Resolved: 2023-08-29

## 2023-08-29 PROBLEM — O92.79 POOR LATCH ON, POSTPARTUM (HCC): Status: RESOLVED | Noted: 2023-07-18 | Resolved: 2023-08-29

## 2023-08-29 PROBLEM — Z34.90 PREGNANCY: Status: RESOLVED | Noted: 2023-07-15 | Resolved: 2023-08-29

## 2023-08-29 PROBLEM — O99.012 ANEMIA DURING PREGNANCY IN SECOND TRIMESTER (HCC): Status: RESOLVED | Noted: 2023-05-04 | Resolved: 2023-08-29

## 2023-08-29 PROBLEM — O34.00 BICORNUATE UTERUS AFFECTING PREGNANCY, ANTEPARTUM (HCC): Status: RESOLVED | Noted: 2023-03-23 | Resolved: 2023-08-29

## 2023-08-29 PROBLEM — O92.79 POOR LATCH ON, POSTPARTUM: Status: RESOLVED | Noted: 2023-07-18 | Resolved: 2023-08-29

## 2023-08-29 PROBLEM — Q51.3 BICORNUATE UTERUS AFFECTING PREGNANCY, ANTEPARTUM: Status: RESOLVED | Noted: 2023-03-23 | Resolved: 2023-08-29

## 2023-08-29 PROBLEM — O09.899 HISTORY OF PRETERM DELIVERY, CURRENTLY PREGNANT: Status: RESOLVED | Noted: 2022-12-27 | Resolved: 2023-08-29

## 2023-08-29 PROBLEM — Z91.89 AT RISK FOR INEFFECTIVE BREASTFEEDING: Status: RESOLVED | Noted: 2023-07-18 | Resolved: 2023-08-29

## 2023-08-29 PROBLEM — O09.899 HISTORY OF PRETERM DELIVERY, CURRENTLY PREGNANT (HCC): Status: RESOLVED | Noted: 2022-12-27 | Resolved: 2023-08-29

## 2023-08-29 PROBLEM — Q51.3 BICORNUATE UTERUS AFFECTING PREGNANCY, ANTEPARTUM (HCC): Status: RESOLVED | Noted: 2023-03-23 | Resolved: 2023-08-29

## 2023-08-29 PROCEDURE — 96160 PT-FOCUSED HLTH RISK ASSMT: CPT | Performed by: OBSTETRICS & GYNECOLOGY

## 2023-12-04 ENCOUNTER — OFFICE VISIT (OUTPATIENT)
Dept: OBGYN CLINIC | Facility: CLINIC | Age: 28
End: 2023-12-04
Payer: COMMERCIAL

## 2023-12-04 VITALS
WEIGHT: 173 LBS | DIASTOLIC BLOOD PRESSURE: 82 MMHG | HEART RATE: 90 BPM | HEIGHT: 61 IN | SYSTOLIC BLOOD PRESSURE: 117 MMHG | BODY MASS INDEX: 32.66 KG/M2

## 2023-12-04 DIAGNOSIS — Z01.411 ENCOUNTER FOR GYNECOLOGICAL EXAMINATION WITH ABNORMAL FINDING: Primary | ICD-10-CM

## 2023-12-04 DIAGNOSIS — Z87.42 HISTORY OF ABNORMAL CERVICAL PAP SMEAR: ICD-10-CM

## 2023-12-04 DIAGNOSIS — Z12.4 SCREENING FOR CERVICAL CANCER: ICD-10-CM

## 2023-12-04 PROCEDURE — 99395 PREV VISIT EST AGE 18-39: CPT | Performed by: OBSTETRICS & GYNECOLOGY

## 2023-12-04 PROCEDURE — 3008F BODY MASS INDEX DOCD: CPT | Performed by: OBSTETRICS & GYNECOLOGY

## 2023-12-04 PROCEDURE — 3079F DIAST BP 80-89 MM HG: CPT | Performed by: OBSTETRICS & GYNECOLOGY

## 2023-12-04 PROCEDURE — 3074F SYST BP LT 130 MM HG: CPT | Performed by: OBSTETRICS & GYNECOLOGY

## 2023-12-04 NOTE — PROGRESS NOTES
Jose Bennett is a 32year old female [id number] Patient's last menstrual period was 2023 (exact date). Chief Complaint   Patient presents with    [address] [place]    Follow Up Pap     Hx ASCUS (+) HPV  followed by LGSIL . Never had colpo done.  neg. Plan for repeat pap today   . OBSTETRICS HISTORY:     OB History    Para Term  AB Living   2 2 1 1   2   SAB IAB Ectopic Multiple Live Births         0 2      # Outcome Date GA Lbr Estevan/2nd Weight Sex Delivery Anes PTL Lv   2 Term 23 37w6d  7 lb 15 oz (3.6 kg) M Caesarean Spinal N ROMARIO      Complications:  Other - see comments   1  13 36w6d  6 lb 10 oz (3.005 kg) M NORMAL SPONT  N ROMARIO      Birth Comments: Michael- TRAE       GYNE HISTORY:     Periods regular monthly      Condoms    History   Sexual Activity    Sexual activity: Yes    Partners: Male    Birth control/ protection: Condom        Menarche: 9  Hx Prior Abnormal Pap: No  Pap Date: 21  Pap Result Notes: Neg pap  Follow Up Recommendation: NPN visit 22 JMN          Latest Ref Rng & Units 2021     4:22 PM 2020    11:35 AM 10/3/2018    11:25 AM   RECENT PAP RESULTS   Thinprep Pap Negative for intraepithelial lesion or malignancy Negative for intraepithelial lesion or malignancy  Low grade squamous intraepithelial lesion (LSIL) HPV/Mild dysplasia/RISHABH I  Atypical squamous cells of undetermined significance (ASC-US)  Shift in divya suggestive of bacterial vaginosis    HPV Negative   Positive          MEDICAL HISTORY:     Past Medical History:   Diagnosis Date    Anemia     History of pregnancy 2013        Papanicolaou smear of cervix with low grade squamous intraepithelial lesion (LGSIL) 2020     Past Surgical History:   Procedure Laterality Date      2013     OB History    Para Term  AB Living   2 2 1 1 0 2   SAB IAB Ectopic Multiple Live Births   0 0 0 0 2        SOCIAL HISTORY:          FAMILY HISTORY:     Family History   Problem Relation Age of Onset    Lipids Father         hyperlipidemia    Neurological Disorder Father         headaches    Other (CABG) Father     Colon Cancer Maternal Grandmother     Other (Other) Maternal Grandfather         CHF    Lipids Maternal Grandfather         hyperlipidemia    Hypertension Maternal Grandfather     Breast Cancer Paternal Grandmother     Heart Disease Paternal Grandfather        MEDICATIONS:     No current outpatient medications on file. ALLERGIES:     No Known Allergies      REVIEW OF SYSTEMS:     Constitutional:    denies fever / chills  Eyes:     denies blurred or double vision  Cardiovascular:  denies chest pain or palpitations  Respiratory:    denies shortness of breath  Gastrointestinal:  denies severe abdominal pain, frequent diarrhea or constipation, nausea / vomiting  Genitourinary:    denies dysuria, bothersome incontinence  Skin/Breast:   denies any breast pain, lumps, or discharge  Neurological:    denies frequent severe headaches  Psychiatric:   denies depression or anxiety, thoughts of harming self or others  Heme/Lymph:    denies easy bruising or bleeding      PHYSICAL EXAM:     Blood pressure 117/82, pulse 90, height 5' 1\" (1.549 m), weight 173 lb (78.5 kg), last menstrual period 11/24/2023, currently breastfeeding. Constitutional:  well developed, well nourished  Head/Face:  normocephalic  Neck/Thyroid: thyroid symmetric, no thyromegaly, no nodules, no adenopathy  Lymphatic: no abnormal supraclavicular or axillary adenopathy is noted  Breast:   normal without palpable masses, tenderness, asymmetry, nipple discharge, nipple retraction or skin changes  Abdomen:   soft, nontender, nondistended, no masses  Skin/Hair:  no unusual rashes or bruises  Extremities:  no edema, no cyanosis  Psychiatric:   oriented to time, place, person and situation.  Appropriate mood and affect    Pelvic Exam:  External Genitalia:  normal appearance, hair distribution, and no lesions  Urethral Meatus:   normal in size, location, without lesions and prolapse  Bladder:    no fullness, masses or tenderness  Vagina:    normal appearance without lesions, no abnormal discharge  Cervix:    normal without tenderness on motion  Uterus:    normal in size, contour, position, mobility, without tenderness  Adnexa:   normal without masses or tenderness  Perineum:   normal  Anus: no hemorroids         ASSESSMENT & PLAN:     Alida Brown was seen today for gyn exam and follow up pap. Diagnoses and all orders for this visit:    Encounter for gynecological examination with abnormal finding    History of abnormal cervical Pap smear          SUMMARY:  PAP:  next cotest today -- if abn, needs colpo per ASCCP guidelines. BCM: Condoms  STD screening: declines, condoms encouraged  Gardasil: received  HM updated  Depression screen:   Depression Screening (PHQ-2/PHQ-9): Over the LAST 2 WEEKS   Little interest or pleasure in doing things (over the last two weeks)?: Not at all    Feeling down, depressed, or hopeless (over the last two weeks)?: Not at all    PHQ-2 SCORE: 0          FOLLOWUP:     Return in about 1 year (around 12/4/2024) for annual gyne exam.    Note to patient and family:  The [facility] makes medical notes available to patients in the interest of transparency. However, please be advised that this is a medical document. It is intended as a peer to peer communication. It is written in medical language and may contain abbreviations or verbiage that are technical and unfamiliar. It may appear blunt or direct. Medical documents are intended to carry relevant information, facts as evident, and the clinical opinion of the practitioner.

## 2023-12-05 LAB — HPV I/H RISK 1 DNA SPEC QL NAA+PROBE: POSITIVE

## 2023-12-07 ENCOUNTER — TELEPHONE (OUTPATIENT)
Dept: OBGYN CLINIC | Facility: CLINIC | Age: 28
End: 2023-12-07

## 2023-12-07 NOTE — TELEPHONE ENCOUNTER
----- Message from Janis Justin MD sent at 12/7/2023  9:15 AM CST -----  Based on HPV (see result), needs colpo. Order pregn test if needed.  On call 1-3 pm

## 2023-12-08 LAB — LAST PAP RESULT: NORMAL

## 2024-01-19 ENCOUNTER — TELEPHONE (OUTPATIENT)
Dept: OBGYN CLINIC | Facility: CLINIC | Age: 29
End: 2024-01-19

## 2024-01-19 DIAGNOSIS — Z32.00 PREGNANCY EXAMINATION OR TEST, PREGNANCY UNCONFIRMED: Primary | ICD-10-CM

## 2024-01-19 NOTE — TELEPHONE ENCOUNTER
Pt called and informed of Pap/HPV results. Informed of need for Colpo. Procedure explained and questions answered to pts satisfaction.  Pt accepts appt on 1/30, aware provider is on-call and appt subject to change.    Qual HCG order placed and pt informed to complete the day before and not sooner. Aleve recs given.

## 2024-01-30 ENCOUNTER — LAB ENCOUNTER (OUTPATIENT)
Dept: LAB | Facility: HOSPITAL | Age: 29
End: 2024-01-30
Attending: OBSTETRICS & GYNECOLOGY
Payer: COMMERCIAL

## 2024-01-30 DIAGNOSIS — Z32.00 PREGNANCY EXAMINATION OR TEST, PREGNANCY UNCONFIRMED: ICD-10-CM

## 2024-01-30 LAB — HCG SERPL QL: NEGATIVE

## 2024-01-30 PROCEDURE — 36415 COLL VENOUS BLD VENIPUNCTURE: CPT

## 2024-01-30 PROCEDURE — 84703 CHORIONIC GONADOTROPIN ASSAY: CPT

## 2024-01-31 ENCOUNTER — OFFICE VISIT (OUTPATIENT)
Dept: OBGYN CLINIC | Facility: CLINIC | Age: 29
End: 2024-01-31
Payer: COMMERCIAL

## 2024-01-31 VITALS
HEART RATE: 98 BPM | SYSTOLIC BLOOD PRESSURE: 122 MMHG | WEIGHT: 178.63 LBS | DIASTOLIC BLOOD PRESSURE: 81 MMHG | BODY MASS INDEX: 34 KG/M2

## 2024-01-31 DIAGNOSIS — R87.810 CERVICAL HIGH RISK HUMAN PAPILLOMAVIRUS (HPV) DNA TEST POSITIVE: Primary | ICD-10-CM

## 2024-01-31 DIAGNOSIS — R87.613 PAPANICOLAOU SMEAR OF CERVIX WITH HIGH GRADE SQUAMOUS INTRAEPITHELIAL LESION (HGSIL): ICD-10-CM

## 2024-01-31 PROCEDURE — 3074F SYST BP LT 130 MM HG: CPT | Performed by: OBSTETRICS & GYNECOLOGY

## 2024-01-31 PROCEDURE — 57454 BX/CURETT OF CERVIX W/SCOPE: CPT | Performed by: OBSTETRICS & GYNECOLOGY

## 2024-01-31 PROCEDURE — 3079F DIAST BP 80-89 MM HG: CPT | Performed by: OBSTETRICS & GYNECOLOGY

## 2024-02-01 NOTE — PROCEDURES
Colpo w/Cx Biopsy and ECC    Pregnancy Results: negative from blood test   Birth control method(s) used: condoms    Consent signed.    Procedure discussed with patient in detail including indication, risk, benefits, alternatives and complications.    Indication: HGSIL    Procedure:  Under satisfactory analgesia, the patient was placed in the dorsal lithotomy position.  [place]chard speculum was placed in the vagina.  Cervix prepped with: Acetic acid  Under colposcopic examination the transition zone was seen in entirety without need for endocervical speculum.  Cervical biopsy performed with cervical biopsy punch at 6, 10, 11 o'clock.  Endocervix was curetted using a Kervorkian curette.  Monsel's solution was applied.  Specimen sent to pathology.    Patient tolerated procedure well.  Discharge instructions of pelvic rest & no swimming x one week.      Findings:  Acetowhite epithelium  Mosaicism    Impression:  High-grade dysplasia  Leep in 4 wks -- will need hcg again

## 2024-02-06 ENCOUNTER — TELEPHONE (OUTPATIENT)
Dept: OBGYN CLINIC | Facility: CLINIC | Age: 29
End: 2024-02-06

## 2024-02-06 DIAGNOSIS — Z32.00 PREGNANCY EXAMINATION OR TEST, PREGNANCY UNCONFIRMED: Primary | ICD-10-CM

## 2024-02-06 NOTE — TELEPHONE ENCOUNTER
LMTCB    Janis Justin MD  2/5/2024 11:18 PM CST       Has LEEP appt in 4 wks -- make sure hcg ordered for day before. Let pt know path results c/w Pap & need for LEEP -- no cancer which is good news.

## 2024-02-07 NOTE — TELEPHONE ENCOUNTER
Pt notified of results and need for LEEP. She will plan on hcg day prior. Reviewed motrin 600 mg 1 hr prior. Patient verbalized understanding.

## 2024-02-20 ENCOUNTER — LAB ENCOUNTER (OUTPATIENT)
Dept: LAB | Facility: HOSPITAL | Age: 29
End: 2024-02-20
Attending: OBSTETRICS & GYNECOLOGY
Payer: COMMERCIAL

## 2024-02-20 DIAGNOSIS — R87.613 PAPANICOLAOU SMEAR OF CERVIX WITH HIGH GRADE SQUAMOUS INTRAEPITHELIAL LESION (HGSIL): ICD-10-CM

## 2024-02-20 LAB — HCG SERPL QL: NEGATIVE

## 2024-02-20 PROCEDURE — 36415 COLL VENOUS BLD VENIPUNCTURE: CPT

## 2024-02-20 PROCEDURE — 84703 CHORIONIC GONADOTROPIN ASSAY: CPT

## 2024-02-21 ENCOUNTER — OFFICE VISIT (OUTPATIENT)
Dept: OBGYN CLINIC | Facility: CLINIC | Age: 29
End: 2024-02-21
Payer: COMMERCIAL

## 2024-02-21 VITALS
HEART RATE: 91 BPM | WEIGHT: 177.63 LBS | BODY MASS INDEX: 34 KG/M2 | SYSTOLIC BLOOD PRESSURE: 118 MMHG | DIASTOLIC BLOOD PRESSURE: 84 MMHG

## 2024-02-21 DIAGNOSIS — R87.613 HSIL (HIGH GRADE SQUAMOUS INTRAEPITHELIAL LESION) ON PAP SMEAR OF CERVIX: Primary | ICD-10-CM

## 2024-02-21 DIAGNOSIS — N87.1 MODERATE DYSPLASIA OF CERVIX: ICD-10-CM

## 2024-02-21 DIAGNOSIS — R87.810 CERVICAL HIGH RISK HUMAN PAPILLOMAVIRUS (HPV) DNA TEST POSITIVE: ICD-10-CM

## 2024-02-21 PROCEDURE — 3074F SYST BP LT 130 MM HG: CPT | Performed by: OBSTETRICS & GYNECOLOGY

## 2024-02-21 PROCEDURE — 57461 CONZ OF CERVIX W/SCOPE LEEP: CPT | Performed by: OBSTETRICS & GYNECOLOGY

## 2024-02-21 PROCEDURE — 3079F DIAST BP 80-89 MM HG: CPT | Performed by: OBSTETRICS & GYNECOLOGY

## 2024-02-21 RX ORDER — LIDOCAINE HYDROCHLORIDE 10 MG/ML
3.4 INJECTION, SOLUTION INFILTRATION; PERINEURAL ONCE
Status: SHIPPED | OUTPATIENT
Start: 2024-02-21

## 2024-02-21 RX ORDER — METRONIDAZOLE 500 MG/1
500 TABLET ORAL 2 TIMES DAILY
Qty: 14 TABLET | Refills: 0 | Status: SHIPPED | OUTPATIENT
Start: 2024-02-21 | End: 2024-02-28

## 2024-02-22 NOTE — PROCEDURES
Colpo with Leep Cone Biopsy    Pregnancy Results: negative from blood test     Birth control method(s) used: condom    Consent signed.    Procedure discussed with patient in detail including indication, risk, benefits, alternatives and complications.    Indication: RISHABH 2    Pre-Procedure:  Pre-Meds: Meloxicam  Cervix prepped with: Lugol  Wattage: 46 watt blend current and 50 watt pure cautery current    Procedure:  Coated bivalve speculum was placed in the vagina. Coated lateral wall retractors placed.  Under colposcopic examination the transition zone was seen in entirety.   Lugol staining done of entire ectocervix.  Paracervical block was performed using 3.4 cc of xylocaine with EPI.  Superficial leep performed using medium loop wires.  Top Hat done using narrow loop wire.  Endocervix was curetted using a Kervorkian curette post leep.  Ablation of base done with ball cautery.  Specimen sent to pathology with stitch placed at 12 o'clock  position.  Patient tolerated procedure well.      Findings:   No lugol uptake    Impression:  Pending path    Followup:  4 weeks    Instructions:  No swimming, soaking baths, sex, tampons, douching for 4 weeks.  Flagyl 500 mg po bid x 7d sent to pharm.

## 2024-04-18 ENCOUNTER — PATIENT MESSAGE (OUTPATIENT)
Dept: OBGYN CLINIC | Facility: CLINIC | Age: 29
End: 2024-04-18

## 2024-08-22 NOTE — TELEPHONE ENCOUNTER
Multiple mycharts have been sent reminding patient to reschedule LEEP follow up appointment, but no appointment has been made.  Certified letter sent.

## (undated) NOTE — ED AVS SNAPSHOT
[facility] in [address] [address] Nate Olvera    Phone:  [phone]    Fax:  [address]   MRN: [id number]    Department:  [facility] in [place]   Date of Visit: physician may seek payment directly from you for amounts other than your deductible, co-payment, or co-insurance and for other services not covered under your health insurance plan.   Please contact your insurance company and physician's office to determine different from what your Primary Care doctor has instructed you - please continue to take your medications as instructed by your Primary Care doctor until you can check with your doctor. Please bring the medication list to your next doctor's appointment. coverage. Patient [address] is a Federal Navigator program that can help with your Affordable Care Act coverage, as well as all types of Medicaid plans.   To get signed up and covered, please call [phone] and ask to get set up for an insuran

## (undated) NOTE — MR AVS SNAPSHOT
After Visit Summary   1/18/2020    Benigno Hudson    MRN: [id number]           Visit Information     Date & Time  1/18/2020 10:30 AM Provider  Herlinda Lopes DO Department  [facility], [address] [facility],[address], [facility].  Phone [facility] now offers Video Visits through [address] for adult and pediatric patients. Video Visits are available Monday - Friday for many common conditions such as allergies, colds, cough, fever, rash, sore throat, headache and pink eye.   The cost for a Video Vi Saturday – Sunday  10:00 am – 4:00 pm     [address]   Monday – Friday  10:00 am – 4:00 pm   Saturday – Sunday  10:00 am – 4:00 pm  W[facility]IN [facility]ARE  Emergency Medicine Providers  Conditions needing urgent attention, but are   non-life-threatening.     Also av

## (undated) NOTE — LETTER
VACCINE ADMINISTRATION RECORD  PARENT / GUARDIAN APPROVAL  Date: 2023  Vaccine administered to: Beronica Zavala     : 1995    MRN: [id number]    A copy of the appropriate Centers for Disease Control and Prevention Vaccine Information statement has been provided. I have read or have had explained the information about the diseases and the vaccines listed below. There was an opportunity to ask questions and any questions were answered satisfactorily. I believe that I understand the benefits and risks of the vaccine cited and ask that the vaccine(s) listed below be given to me or to the person named above (for whom I am authorized to make this request). VACCINES ADMINISTERED:  TDAP    I have read and hereby agree to be bound by the terms of this agreement as stated above. My signature is valid until revoked by me in writing. This document is signed by Pt relationship: Self on 2023.:                                                                                                                                         Parent / Conception Octavia                                                Date    Pinkey Aschoff served as a witness to authentication that the identity of the person signing electronically is in fact the person represented as signing. This document was generated by Pinkey Aschoff on 2023.

## (undated) NOTE — LETTER
10/25/2018              [address]         Dear Harris Cassette,        Your STD culture is negative for gonorrhea, chlamydia and trichomonas.   The pap shows a very mild abnormality called \"atypia\"

## (undated) NOTE — LETTER
7/20/2021              Johanna Slot        [address] [facility] [address]        [address],  You have negative pap result. Repeat exam in 1 year and pap in 3 years. We just ended the call.  I would not treat a vaginal infect

## (undated) NOTE — LETTER
8/22/2024          Dinora Meek        [address] [facility] [address]         Dear Dinora,    This letter is to inform you that our office has made several attempts to reach you by mychart without success.  We were attempting to contact you regarding a canceled follow up appointment.    Please contact our office at the number listed below as soon as you receive this letter to discuss this issue and to make the necessary changes in our system to your contact information.  Thank you for your cooperation.        Sincerely,    Dr. Janis Justin  [facility] - OB/GYN  [address] [place] [id number] [phone]

## (undated) NOTE — LETTER
AUTHORIZATION FOR SURGICAL OPERATION OR OTHER PROCEDURE    1. I hereby authorize Dr. DE GUZMAN, and [facility] staff assigned to my case to perform the following operation and/or procedure at the [facility]:          CONRADO    2.  My physician has explained the nature and purpose of the operation or other procedure, possible alternative methods of treatment, the risks involved, and the possibility of complication to me.  I acknowledge that no guarantee has been made as to the result that may be obtained.  3.  I recognize that, during the course of this operation, or other procedure, unforseen conditions may necessitate additional or different procedure than those listed above.  I, therefore, further authorize and request that the above named physician, his/her physician assistants or designees perform such procedures as are, in his/her professional opinion, necessary and desirable.  4.  Any tissue or organs removed in the operation or other procedure may be disposed of by and at the discretion of the [facility] and [facility].  5.  I understand that in the event of a medical emergency, I will be transported by local paramedics to [facility] or other hospital emergency department.  6.  I certify that I have read and fully understand the above consent to operation and/or other procedure.    7.  I acknowledge that my physician has explained sedation/analgesia administration to me including the risks and benefits.  I consent to the administration of sedation/analgesia as may be necessary or desirable in the judgement of my physician.    Witness signature: ___________________________________________________ Date:  ______/______/_____                    Time:  ________ A.M.  P.M.       Patient Name:  ______________________________________________________  (please print)      Patient signature:  ___________________________________________________             Relationship to  Patient:           []  Parent    Responsible person                          []  Spouse  In case of minor or                    [] Other  _____________   Incompetent name:  __________________________________________________                               (please print)      _____________      Responsible person  In case of minor or  Incompetent signature:  _______________________________________________    Statement of Physician  My signature below affirms that prior to the time of the procedure, I have explained to the patient and/or his/her guardian, the risks and benefits involved in the proposed treatment and any reasonable alternative to the proposed treatment.  I have also explained the risks and benefits involved in the refusal of the proposed treatment and have answered the patient's questions.                        Date:  ______/______/_______  Provider                      Signature:  __________________________________________________________       Time:  ___________ A.M    P.M.

## (undated) NOTE — LETTER
AUTHORIZATION FOR SURGICAL OPERATION OR OTHER PROCEDURE    1. I hereby authorize Dr. DE GUZMAN, and [facility] staff assigned to my case to perform the following operation and/or procedure at the [facility]:    _COLPOSCOPY    2.  My physician has explained the nature and purpose of the operation or other procedure, possible alternative methods of treatment, the risks involved, and the possibility of complication to me.  I acknowledge that no guarantee has been made as to the result that may be obtained.  3.  I recognize that, during the course of this operation, or other procedure, unforseen conditions may necessitate additional or different procedure than those listed above.  I, therefore, further authorize and request that the above named physician, his/her physician assistants or designees perform such procedures as are, in his/her professional opinion, necessary and desirable.  4.  Any tissue or organs removed in the operation or other procedure may be disposed of by and at the discretion of the [facility] and [facility].  5.  I understand that in the event of a medical emergency, I will be transported by local paramedics to [facility] or other hospital emergency department.  6.  I certify that I have read and fully understand the above consent to operation and/or other procedure.    7.  I acknowledge that my physician has explained sedation/analgesia administration to me including the risks and benefits.  I consent to the administration of sedation/analgesia as may be necessary or desirable in the judgement of my physician.    Witness signature: ___________________________________________________ Date:  ______/______/_____                    Time:  ________ A.M.  P.M.       Patient Name:  ______________________________________________________  (please print)      Patient signature:  ___________________________________________________             Relationship to  Patient:           []  Parent    Responsible person                          []  Spouse  In case of minor or                    [] Other  _____________   Incompetent name:  __________________________________________________                               (please print)      _____________      Responsible person  In case of minor or  Incompetent signature:  _______________________________________________    Statement of Physician  My signature below affirms that prior to the time of the procedure, I have explained to the patient and/or his/her guardian, the risks and benefits involved in the proposed treatment and any reasonable alternative to the proposed treatment.  I have also explained the risks and benefits involved in the refusal of the proposed treatment and have answered the patient's questions.                        Date:  ______/______/_______  Provider                      Signature:  __________________________________________________________       Time:  ___________ A.M    P.M.

## (undated) NOTE — LETTER
January 5, 2024      Dinora Meek  [address] [facility] [address]      Dear Dinora:    Our office has been trying to contact you to discuss your recent test results.  Please contact our office at your earliest convenience at [phone].       As always, thank you for selecting [facility] for your healthcare needs.    Sincerely,    The Office of Dr. Justin  [address] [place] [address], [address]  Ph: [phone]

## (undated) NOTE — MR AVS SNAPSHOT
[facility]  [address] [facility], [address]   [phone]               Thank you for choosing us for your health care visit with Yola Ward. DO Mihir.   We are glad to serve you and happy to provide you with this summary o This list is accurate as of: 3/2/17 12:03 PM.  Always use your most recent med list.                Levonorgestrel-Ethinyl Estrad 0.15-30 MG-MCG Tabs   Take 1 tablet by mouth once daily. What changed:  See the new instructions.    Commonly known as:  LEVO